# Patient Record
Sex: FEMALE | Race: WHITE | Employment: FULL TIME | URBAN - METROPOLITAN AREA
[De-identification: names, ages, dates, MRNs, and addresses within clinical notes are randomized per-mention and may not be internally consistent; named-entity substitution may affect disease eponyms.]

---

## 2020-10-26 LAB
ANTIBODY SCREEN, EXTERNAL: NEGATIVE
CHLAMYDIA, EXTERNAL: NEGATIVE
HBSAG, EXTERNAL: NON REACTIVE
HIV, EXTERNAL: NON REACTIVE
N. GONORRHEA, EXTERNAL: NEGATIVE
RUBELLA, EXTERNAL: NORMAL
T. PALLIDUM, EXTERNAL: NON REACTIVE
TYPE, ABO & RH, EXTERNAL: NORMAL

## 2020-12-19 ENCOUNTER — APPOINTMENT (OUTPATIENT)
Dept: ULTRASOUND IMAGING | Age: 35
End: 2020-12-19
Attending: EMERGENCY MEDICINE
Payer: COMMERCIAL

## 2020-12-19 ENCOUNTER — HOSPITAL ENCOUNTER (EMERGENCY)
Age: 35
Discharge: HOME | End: 2020-12-19
Attending: EMERGENCY MEDICINE
Payer: COMMERCIAL

## 2020-12-19 VITALS
RESPIRATION RATE: 16 BRPM | TEMPERATURE: 98.5 F | SYSTOLIC BLOOD PRESSURE: 125 MMHG | HEART RATE: 78 BPM | OXYGEN SATURATION: 100 % | DIASTOLIC BLOOD PRESSURE: 88 MMHG

## 2020-12-19 DIAGNOSIS — O26.892 ABDOMINAL PAIN DURING PREGNANCY IN SECOND TRIMESTER: Primary | ICD-10-CM

## 2020-12-19 DIAGNOSIS — R10.9 ABDOMINAL PAIN DURING PREGNANCY IN SECOND TRIMESTER: Primary | ICD-10-CM

## 2020-12-19 LAB
ALBUMIN SERPL-MCNC: 3.6 G/DL (ref 3.5–5)
ALBUMIN/GLOB SERPL: 0.9 {RATIO} (ref 1.1–2.2)
ALP SERPL-CCNC: 50 U/L (ref 45–117)
ALT SERPL-CCNC: 38 U/L (ref 12–78)
ANION GAP SERPL CALC-SCNC: 4 MMOL/L (ref 5–15)
APPEARANCE UR: CLEAR
AST SERPL-CCNC: 24 U/L (ref 15–37)
BASOPHILS # BLD: 0 K/UL (ref 0–0.1)
BASOPHILS NFR BLD: 0 % (ref 0–1)
BILIRUB SERPL-MCNC: 0.4 MG/DL (ref 0.2–1)
BILIRUB UR QL: NEGATIVE
BUN SERPL-MCNC: 6 MG/DL (ref 6–20)
BUN/CREAT SERPL: 10 (ref 12–20)
CALCIUM SERPL-MCNC: 9.4 MG/DL (ref 8.5–10.1)
CHLORIDE SERPL-SCNC: 108 MMOL/L (ref 97–108)
CO2 SERPL-SCNC: 24 MMOL/L (ref 21–32)
COLOR UR: NORMAL
COMMENT, HOLDF: NORMAL
CREAT SERPL-MCNC: 0.62 MG/DL (ref 0.55–1.02)
DIFFERENTIAL METHOD BLD: NORMAL
EOSINOPHIL # BLD: 0 K/UL (ref 0–0.4)
EOSINOPHIL NFR BLD: 1 % (ref 0–7)
ERYTHROCYTE [DISTWIDTH] IN BLOOD BY AUTOMATED COUNT: 12.5 % (ref 11.5–14.5)
GLOBULIN SER CALC-MCNC: 4 G/DL (ref 2–4)
GLUCOSE SERPL-MCNC: 84 MG/DL (ref 65–100)
GLUCOSE UR STRIP.AUTO-MCNC: NEGATIVE MG/DL
HCT VFR BLD AUTO: 36.3 % (ref 35–47)
HGB BLD-MCNC: 12.3 G/DL (ref 11.5–16)
HGB UR QL STRIP: NEGATIVE
IMM GRANULOCYTES # BLD AUTO: 0 K/UL (ref 0–0.04)
IMM GRANULOCYTES NFR BLD AUTO: 0 % (ref 0–0.5)
KETONES UR QL STRIP.AUTO: NEGATIVE MG/DL
LEUKOCYTE ESTERASE UR QL STRIP.AUTO: NEGATIVE
LYMPHOCYTES # BLD: 1.5 K/UL (ref 0.8–3.5)
LYMPHOCYTES NFR BLD: 22 % (ref 12–49)
MCH RBC QN AUTO: 31.5 PG (ref 26–34)
MCHC RBC AUTO-ENTMCNC: 33.9 G/DL (ref 30–36.5)
MCV RBC AUTO: 92.8 FL (ref 80–99)
MONOCYTES # BLD: 0.6 K/UL (ref 0–1)
MONOCYTES NFR BLD: 9 % (ref 5–13)
NEUTS SEG # BLD: 4.6 K/UL (ref 1.8–8)
NEUTS SEG NFR BLD: 68 % (ref 32–75)
NITRITE UR QL STRIP.AUTO: NEGATIVE
NRBC # BLD: 0 K/UL (ref 0–0.01)
NRBC BLD-RTO: 0 PER 100 WBC
PH UR STRIP: 7.5 [PH] (ref 5–8)
PLATELET # BLD AUTO: 213 K/UL (ref 150–400)
PMV BLD AUTO: 9.3 FL (ref 8.9–12.9)
POTASSIUM SERPL-SCNC: 4.1 MMOL/L (ref 3.5–5.1)
PROT SERPL-MCNC: 7.6 G/DL (ref 6.4–8.2)
PROT UR STRIP-MCNC: NEGATIVE MG/DL
RBC # BLD AUTO: 3.91 M/UL (ref 3.8–5.2)
SAMPLES BEING HELD,HOLD: NORMAL
SODIUM SERPL-SCNC: 136 MMOL/L (ref 136–145)
SP GR UR REFRACTOMETRY: 1.01 (ref 1–1.03)
UROBILINOGEN UR QL STRIP.AUTO: 0.2 EU/DL (ref 0.2–1)
WBC # BLD AUTO: 6.8 K/UL (ref 3.6–11)

## 2020-12-19 PROCEDURE — 80053 COMPREHEN METABOLIC PANEL: CPT

## 2020-12-19 PROCEDURE — 81003 URINALYSIS AUTO W/O SCOPE: CPT

## 2020-12-19 PROCEDURE — 36415 COLL VENOUS BLD VENIPUNCTURE: CPT

## 2020-12-19 PROCEDURE — 76815 OB US LIMITED FETUS(S): CPT

## 2020-12-19 PROCEDURE — 99285 EMERGENCY DEPT VISIT HI MDM: CPT

## 2020-12-19 PROCEDURE — 99284 EMERGENCY DEPT VISIT MOD MDM: CPT

## 2020-12-19 PROCEDURE — 85025 COMPLETE CBC W/AUTO DIFF WBC: CPT

## 2020-12-19 RX ORDER — CYCLOBENZAPRINE HCL 5 MG
5 TABLET ORAL
Qty: 12 TAB | Refills: 0 | Status: SHIPPED | OUTPATIENT
Start: 2020-12-19 | End: 2021-03-20

## 2020-12-19 NOTE — ED NOTES
I have reviewed discharge instructions with the patient. The patient verbalized understanding. Patient informed prescriptions were sent to pharmacy. All peripheral IVs removed. Patient given an opportunity to ask questions. All questions answered. RN walked patient to ED entrance.

## 2020-12-19 NOTE — DISCHARGE INSTRUCTIONS
Dr. Сергей Salazar has recommended the following options for your pain:  1. Tylenol  2. Flexeril (muscle relaxant) electronically sent to your pharmacy if you choose to pick it up. 3.  Pelvic physical therapy. 4.  YouTube prenatal yoga videos.   5.  YouTube prenatal stretch videos

## 2020-12-19 NOTE — ED TRIAGE NOTES
Pt arrives from home ambulatory from home with CC of sharp abdominal pain. Pt is currently 16 weeks pregnant. States this has happened before but not as severe and the OB believed it was nerve pain. Pt very anxious and tearful in triage.

## 2020-12-19 NOTE — ED PROVIDER NOTES
The history is provided by the patient. Abdominal Pain   This is a recurrent problem. The current episode started 6 to 12 hours ago. The problem occurs constantly. The problem has been gradually worsening. The pain is located in the RLQ and LLQ. The pain is severe. Associated symptoms include nausea. Pertinent negatives include no fever, no diarrhea, no hematochezia, no melena and no constipation. Nothing worsens the pain. The pain is relieved by nothing. Pregnancy Problem   This is a recurrent problem. The problem occurs constantly. The problem has been gradually worsening. Associated symptoms include nausea. Pertinent negatives include no fever, no diarrhea, no hematochezia, no melena and no constipation. Nothing worsens the pain. The pain is relieved by nothing. Past Medical History:   Diagnosis Date    Abnormal Pap smear     Psychiatric disorder     ANXIETY/DEPRESSION    Thyroid disease     hypothyroidism       Past Surgical History:   Procedure Laterality Date    HX HEENT      WISDOM TEETH    HX ORTHOPAEDIC  1998    A/S RIGHT HIP         Family History:   Problem Relation Age of Onset    Heart Disease Paternal Uncle         CABG, CAD    Cancer Maternal Grandmother         BREAST CA    Cancer Maternal Grandfather         PANCREATIC CA    Heart Disease Paternal Grandfather         CHF       Social History     Socioeconomic History    Marital status: SINGLE     Spouse name: Not on file    Number of children: Not on file    Years of education: Not on file    Highest education level: Not on file   Occupational History    Not on file   Social Needs    Financial resource strain: Not on file    Food insecurity     Worry: Not on file     Inability: Not on file    Transportation needs     Medical: Not on file     Non-medical: Not on file   Tobacco Use    Smoking status: Never Smoker   Substance and Sexual Activity    Alcohol use:  Yes     Alcohol/week: 1.7 standard drinks     Types: 2 Glasses of wine per week    Drug use: Not on file    Sexual activity: Not on file   Lifestyle    Physical activity     Days per week: Not on file     Minutes per session: Not on file    Stress: Not on file   Relationships    Social connections     Talks on phone: Not on file     Gets together: Not on file     Attends Islam service: Not on file     Active member of club or organization: Not on file     Attends meetings of clubs or organizations: Not on file     Relationship status: Not on file    Intimate partner violence     Fear of current or ex partner: Not on file     Emotionally abused: Not on file     Physically abused: Not on file     Forced sexual activity: Not on file   Other Topics Concern    Not on file   Social History Narrative    Not on file         ALLERGIES: Amoxicillin, Erythromycin, Penicillin g, and Sulfa (sulfonamide antibiotics)    Review of Systems   Constitutional: Negative for fever. Gastrointestinal: Positive for abdominal pain and nausea. Negative for constipation, diarrhea, hematochezia and melena. Psychiatric/Behavioral: The patient is nervous/anxious. All other systems reviewed and are negative. Vitals:    12/19/20 0705   BP: (!) 155/95   Pulse: (!) 107   Resp: 18   Temp: 98.3 °F (36.8 °C)   SpO2: 98%            Physical Exam  Vitals signs and nursing note reviewed. Constitutional:       Appearance: She is well-developed. HENT:      Head: Normocephalic and atraumatic. Eyes:      Extraocular Movements: Extraocular movements intact. Pupils: Pupils are equal, round, and reactive to light. Cardiovascular:      Rate and Rhythm: Normal rate and regular rhythm. Pulmonary:      Effort: Pulmonary effort is normal.      Breath sounds: Normal breath sounds. Abdominal:      General: Bowel sounds are normal.      Tenderness: There is abdominal tenderness in the right lower quadrant, suprapubic area and left lower quadrant. Hernia: No hernia is present.    Skin: General: Skin is warm and dry. Capillary Refill: Capillary refill takes less than 2 seconds. Neurological:      General: No focal deficit present. Mental Status: She is alert and oriented to person, place, and time. Psychiatric:         Mood and Affect: Mood normal.         Behavior: Behavior normal.        Physical exam is significant for mild tenderness to palpation in the suprapubic area. Patient reports that palpation in that area produces referred pressure sensation to her rectum. MDM  Number of Diagnoses or Management Options  Abdominal pain during pregnancy in second trimester: new and requires workup     Amount and/or Complexity of Data Reviewed  Clinical lab tests: reviewed  Tests in the radiology section of CPT®: reviewed  Discuss the patient with other providers: yes    Risk of Complications, Morbidity, and/or Mortality  Presenting problems: moderate  Diagnostic procedures: moderate  Management options: moderate    Patient Progress  Patient progress: stable    ED Course as of Dec 19 1034   Sat Dec 19, 2020   1010 Discussed the case with Dr. Ludivina Sandoval, [facility] hospitalist on call. Reviewed presentation, work-up, and results. She is in agreement with appropriate discharged home. She recommended Tylenol as needed for the pain she also said that in some instances she will provide Flexeril for patient. She also recommended YouTube videos of prenatal yoga, and stretches. She also recommended pelvic PT. patient will be informed of this discussion with the hospitalist specialist and instructed to follow-up with her primary care doctor.     [VT]      ED Course User Index  [VT] Norman Flores MD       Procedures    LABORATORY TESTS:   Labs Reviewed   METABOLIC PANEL, COMPREHENSIVE - Abnormal; Notable for the following components:       Result Value    Anion gap 4 (*)     BUN/Creatinine ratio 10 (*)     A-G Ratio 0.9 (*)     All other components within normal limits   CBC WITH AUTOMATED DIFF URINALYSIS W/ RFLX MICROSCOPIC   SAMPLES BEING HELD   SAMPLE TO BLOOD BANK       IMAGING RESULTS:   US PREG UTS LTD   Final Result   IMPRESSION: Viable intrauterine pregnancy estimated gestational age of 12 weeks   and 3 days. MEDICATIONS GIVEN:  Medications - No data to display    IMPRESSION:  1. Abdominal pain during pregnancy in second trimester        PLAN:  1.  Discharge to Home

## 2021-01-28 ENCOUNTER — TRANSCRIBE ORDER (OUTPATIENT)
Dept: SCHEDULING | Age: 36
End: 2021-01-28

## 2021-01-28 DIAGNOSIS — R10.10 UPPER ABDOMINAL PAIN: Primary | ICD-10-CM

## 2021-02-10 ENCOUNTER — HOSPITAL ENCOUNTER (OUTPATIENT)
Dept: ULTRASOUND IMAGING | Age: 36
Discharge: HOME | End: 2021-02-10
Attending: OBSTETRICS & GYNECOLOGY
Payer: COMMERCIAL

## 2021-02-10 DIAGNOSIS — R10.10 UPPER ABDOMINAL PAIN: ICD-10-CM

## 2021-02-10 PROCEDURE — 76705 ECHO EXAM OF ABDOMEN: CPT

## 2021-03-20 ENCOUNTER — HOSPITAL ENCOUNTER (EMERGENCY)
Age: 36
Discharge: HOME | End: 2021-03-20
Admitting: OBSTETRICS & GYNECOLOGY
Payer: COMMERCIAL

## 2021-03-20 VITALS
WEIGHT: 160 LBS | BODY MASS INDEX: 24.25 KG/M2 | HEIGHT: 68 IN | RESPIRATION RATE: 18 BRPM | OXYGEN SATURATION: 99 % | SYSTOLIC BLOOD PRESSURE: 133 MMHG | HEART RATE: 78 BPM | DIASTOLIC BLOOD PRESSURE: 67 MMHG

## 2021-03-20 LAB
ALBUMIN SERPL-MCNC: 3.2 G/DL (ref 3.5–5)
ALBUMIN/GLOB SERPL: 0.9 {RATIO} (ref 1.1–2.2)
ALP SERPL-CCNC: 84 U/L (ref 45–117)
ALT SERPL-CCNC: 29 U/L (ref 12–78)
ANION GAP SERPL CALC-SCNC: 6 MMOL/L (ref 5–15)
AST SERPL-CCNC: 16 U/L (ref 15–37)
BASOPHILS # BLD: 0 K/UL (ref 0–0.1)
BASOPHILS NFR BLD: 0 % (ref 0–1)
BILIRUB SERPL-MCNC: 0.2 MG/DL (ref 0.2–1)
BUN SERPL-MCNC: 6 MG/DL (ref 6–20)
BUN/CREAT SERPL: 12 (ref 12–20)
CALCIUM SERPL-MCNC: 9.2 MG/DL (ref 8.5–10.1)
CHLORIDE SERPL-SCNC: 108 MMOL/L (ref 97–108)
CO2 SERPL-SCNC: 26 MMOL/L (ref 21–32)
CREAT SERPL-MCNC: 0.52 MG/DL (ref 0.55–1.02)
CREAT UR-MCNC: 17.7 MG/DL
DIFFERENTIAL METHOD BLD: ABNORMAL
EOSINOPHIL # BLD: 0 K/UL (ref 0–0.4)
EOSINOPHIL NFR BLD: 0 % (ref 0–7)
ERYTHROCYTE [DISTWIDTH] IN BLOOD BY AUTOMATED COUNT: 12.6 % (ref 11.5–14.5)
GLOBULIN SER CALC-MCNC: 3.4 G/DL (ref 2–4)
GLUCOSE SERPL-MCNC: 83 MG/DL (ref 65–100)
HCT VFR BLD AUTO: 33.6 % (ref 35–47)
HGB BLD-MCNC: 11.8 G/DL (ref 11.5–16)
IMM GRANULOCYTES # BLD AUTO: 0.1 K/UL (ref 0–0.04)
IMM GRANULOCYTES NFR BLD AUTO: 1 % (ref 0–0.5)
LDH SERPL L TO P-CCNC: 138 U/L (ref 81–246)
LYMPHOCYTES # BLD: 1.7 K/UL (ref 0.8–3.5)
LYMPHOCYTES NFR BLD: 18 % (ref 12–49)
MCH RBC QN AUTO: 32.9 PG (ref 26–34)
MCHC RBC AUTO-ENTMCNC: 35.1 G/DL (ref 30–36.5)
MCV RBC AUTO: 93.6 FL (ref 80–99)
MONOCYTES # BLD: 0.9 K/UL (ref 0–1)
MONOCYTES NFR BLD: 10 % (ref 5–13)
NEUTS SEG # BLD: 6.9 K/UL (ref 1.8–8)
NEUTS SEG NFR BLD: 71 % (ref 32–75)
NRBC # BLD: 0 K/UL (ref 0–0.01)
NRBC BLD-RTO: 0 PER 100 WBC
PLATELET # BLD AUTO: 167 K/UL (ref 150–400)
PMV BLD AUTO: 9.1 FL (ref 8.9–12.9)
POTASSIUM SERPL-SCNC: 4 MMOL/L (ref 3.5–5.1)
PROT SERPL-MCNC: 6.6 G/DL (ref 6.4–8.2)
PROT UR-MCNC: <5 MG/DL (ref 0–11.9)
PROT/CREAT UR-RTO: NORMAL
RBC # BLD AUTO: 3.59 M/UL (ref 3.8–5.2)
SODIUM SERPL-SCNC: 140 MMOL/L (ref 136–145)
URATE SERPL-MCNC: 4.3 MG/DL (ref 2.6–6)
WBC # BLD AUTO: 9.7 K/UL (ref 3.6–11)

## 2021-03-20 PROCEDURE — 84156 ASSAY OF PROTEIN URINE: CPT

## 2021-03-20 PROCEDURE — 36415 COLL VENOUS BLD VENIPUNCTURE: CPT

## 2021-03-20 PROCEDURE — 80053 COMPREHEN METABOLIC PANEL: CPT

## 2021-03-20 PROCEDURE — 85025 COMPLETE CBC W/AUTO DIFF WBC: CPT

## 2021-03-20 PROCEDURE — 84550 ASSAY OF BLOOD/URIC ACID: CPT

## 2021-03-20 PROCEDURE — 59025 FETAL NON-STRESS TEST: CPT

## 2021-03-20 PROCEDURE — 99285 EMERGENCY DEPT VISIT HI MDM: CPT

## 2021-03-20 PROCEDURE — 83615 LACTATE (LD) (LDH) ENZYME: CPT

## 2021-03-20 NOTE — PROGRESS NOTES
1500-pt here from home with concerns for no fetal movement  1512-L Marques here to see pt  1630-Marques back to see pt with lab results rev'd, pt back on monitor due to feeling low movement, baby visibly moving in belly, will assess  1655-d/c home, undelivered, pt felt baby move, verbalized understanding of d/c instructions

## 2021-03-20 NOTE — ED PROVIDER NOTES
Joe Cervantes is a 36 yo  at 29w3d with an RICA of 21. She presents to the [facility] for decreased fetal movement. Reports she woke up this morning and did not feel any fetal movement for three hours. She then had an hour of normal movement and then 4 hours of none. Reports she normally feels lots of fetal movement throughout the day. Yesterday she felt lots of normal movement. Also reports she has had headaches throughout pregnancy, but yesterday it was more intense. Also reports blurry vision in left eye, this is new since becoming pregnant, but a recent change. Pt has also had right upper abd pain, has had liver work up and it has been determined to be rib pain. Prenatal care has been received at [facility] with Dr. Angeline Murray. Pregnancy complicated by hypothyroidism, being followed by endocrine, currently on 50 mcg synthroid daily. Pt has a history of anxiety weaned off meds prior to pregnancy, but has recently discussed starting zoloft. Pt has also had some elevated BPs in office, with normal home readings, negative Pre E work up in office. The history is provided by the patient. Decreased Fetal Movement   This is a new problem. The current episode started 6 to 12 hours ago. The problem occurs constantly. The problem has not changed since onset. The pain is at a severity of 0/10. The patient is experiencing no pain.         Past Medical History:   Diagnosis Date    Abnormal Pap smear     Abnormal Papanicolaou smear of cervix     LEEP    Complication of anesthesia     nausea and vomiting    Psychiatric disorder     ANXIETY/DEPRESSION    Thyroid disease     hypothyroidism       Past Surgical History:   Procedure Laterality Date    HX HEENT      WISDOM TEETH    HX ORTHOPAEDIC      A/S RIGHT HIP         Family History:   Problem Relation Age of Onset    Cancer Maternal Grandmother         BREAST CA    Cancer Maternal Grandfather         PANCREATIC CA    Heart Disease Paternal Grandfather         CHF   Jewell Curl Heart Disease Paternal Uncle         CABG, CAD       Social History     Socioeconomic History    Marital status:      Spouse name: Not on file    Number of children: Not on file    Years of education: Not on file    Highest education level: Not on file   Occupational History    Not on file   Social Needs    Financial resource strain: Not on file    Food insecurity     Worry: Not on file     Inability: Not on file    Transportation needs     Medical: Not on file     Non-medical: Not on file   Tobacco Use    Smoking status: Never Smoker    Smokeless tobacco: Never Used   Substance and Sexual Activity    Alcohol use: Not Currently     Alcohol/week: 1.7 standard drinks     Types: 2 Glasses of wine per week    Drug use: Not Currently    Sexual activity: Not on file   Lifestyle    Physical activity     Days per week: Not on file     Minutes per session: Not on file    Stress: Not on file   Relationships    Social connections     Talks on phone: Not on file     Gets together: Not on file     Attends Gnosticism service: Not on file     Active member of club or organization: Not on file     Attends meetings of clubs or organizations: Not on file     Relationship status: Not on file    Intimate partner violence     Fear of current or ex partner: Not on file     Emotionally abused: Not on file     Physically abused: Not on file     Forced sexual activity: Not on file   Other Topics Concern     Service Not Asked    Blood Transfusions Not Asked    Caffeine Concern Not Asked    Occupational Exposure Not Asked   Alicia Olguin Hazards Not Asked    Sleep Concern Not Asked    Stress Concern Not Asked    Weight Concern Not Asked    Special Diet Not Asked    Back Care Not Asked    Exercise Not Asked    Bike Helmet Not Asked    Seat Belt Not Asked    Self-Exams Not Asked   Social History Narrative    Not on file         ALLERGIES: Amoxicillin, Erythromycin, Penicillin g, and Sulfa (sulfonamide antibiotics)    Review of Systems   Constitutional: Negative. HENT: Negative. Eyes: Positive for visual disturbance. Respiratory: Negative. Cardiovascular: Negative. Gastrointestinal: Negative. Endocrine: Negative. Genitourinary: Negative. Musculoskeletal: Negative. Skin: Negative. Allergic/Immunologic: Negative. Neurological: Negative. Hematological: Negative. Psychiatric/Behavioral: Negative. Vitals:    03/20/21 1507 03/20/21 1519 03/20/21 1528 03/20/21 1545   BP: (!) 145/95 139/79 139/78 133/67   Pulse: 76 82 98 78   Resp:   18    SpO2:  99%     Weight:   72.6 kg (160 lb)    Height:   5' 8\" (1.727 m)             Physical Exam  Vitals signs and nursing note reviewed. Exam conducted with a chaperone present. Constitutional:       Appearance: Normal appearance. She is normal weight. HENT:      Head: Normocephalic and atraumatic. Nose: Nose normal.      Mouth/Throat:      Mouth: Mucous membranes are moist.      Pharynx: Oropharynx is clear. Eyes:      Extraocular Movements: Extraocular movements intact. Neck:      Musculoskeletal: Normal range of motion and neck supple. Cardiovascular:      Rate and Rhythm: Normal rate and regular rhythm. Pulses: Normal pulses. Heart sounds: Normal heart sounds. Pulmonary:      Effort: Pulmonary effort is normal.      Breath sounds: Normal breath sounds. Abdominal:      Palpations: Abdomen is soft. Comments: Gravid, soft to palpation   Genitourinary:     Comments: deferred  Musculoskeletal: Normal range of motion. Skin:     General: Skin is warm and dry. Capillary Refill: Capillary refill takes less than 2 seconds. Neurological:      General: No focal deficit present. Mental Status: She is alert and oriented to person, place, and time. Mental status is at baseline. Psychiatric:         Mood and Affect: Mood normal.         Behavior: Behavior normal.         Thought Content:  Thought content normal.         Judgment: Judgment normal.      NST: Monitored for 20 minutes, reactive, cat 1, baseline 145, positive accels, no decels, moderate variability, rare ctx, mild to palpation, not felt by pt, resting tone soft    2nd NST prior to discharge:    Monitored for 20 minutes, reactive, cat 1, baseline 145, positive accels, no decels, moderate variability, no ctx, resting tone soft    Patient Vitals for the past 4 hrs:   Pulse Resp BP SpO2   03/20/21 1545 78  133/67    03/20/21 1528 98 18 139/78    03/20/21 1519 82  139/79 99 %   03/20/21 1507 76  (!) 145/95          MDM  Number of Diagnoses or Management Options     Amount and/or Complexity of Data Reviewed  Clinical lab tests: ordered and reviewed  Decide to obtain previous medical records or to obtain history from someone other than the patient: yes  Review and summarize past medical records: yes  Independent visualization of images, tracings, or specimens: yes    Risk of Complications, Morbidity, and/or Mortality  Presenting problems: moderate  Diagnostic procedures: moderate  Management options: moderate    Critical Care  Total time providing critical care: > 105 minutes    Patient Progress  Patient progress: stable    ED Course as of Mar 20 1624   Sat Mar 20, 2021   1519 Admit to [facility]  NST  Pre E labs and serial BPs    [LA]   1622 PROTEIN/CREATININE RATIO, URINE:    Protein, urine random <5   Creatinine, urine 17.70   Protein/Creat. urine Ratio Cannot be calculated [LA]   1622 LD:     [LA]   8564 METABOLIC PANEL, COMPREHENSIVE(!):    Sodium 140   Potassium 4.0   Chloride 108   CO2 26   Anion gap 6   Glucose 83   BUN 6   Creatinine 0.52(!)   BUN/Creatinine ratio 12   GFR est AA >60   GFR est non-AA >60   Calcium 9.2   Bilirubin, total 0.2   ALT 29   AST 16   Alk. phosphatase 84   Protein, total 6.6   Albumin 3.2(!)   Globulin 3.4   A-G Ratio 0.9(!) [LA]   1622 URIC ACID:    Uric acid 4.3 [LA]   1622 Reviewed labs and BPs with patient.  Pt feeling less anxious now. Reviewed in detail s/s of pre e and when to return with concerns. Also reviewed in detail fetal kick counts and movement and a low threshold to return with any concerns. Pt verbalized understanding. Discharged home. Keep next routine OB appt. CBC WITH AUTOMATED DIFF(!):    WBC 9.7   RBC 3.59(!)   HGB 11.8   HCT 33.6(!)   MCV 93.6   MCH 32.9   MCHC 35.1   RDW 12.6   PLATELET 097   MPV 9.1   NRBC 0.0   ABSOLUTE NRBC 0.00   NEUTROPHILS 71   LYMPHOCYTES 18   MONOCYTES 10   EOSINOPHILS 0   BASOPHILS 0   IMMATURE GRANULOCYTES 1(!)   ABS. NEUTROPHILS 6.9   ABS. LYMPHOCYTES 1.7   ABS. MONOCYTES 0.9   ABS. EOSINOPHILS 0.0   ABS. BASOPHILS 0.0   ABS. IMM.  GRANS. 0.1(!)   DF AUTOMATED [LA]      ED Course User Index  [LA] Giovanna Chand CNM

## 2021-03-20 NOTE — DISCHARGE INSTRUCTIONS
Patient Education        Weeks 26 to 30 of Your Pregnancy: Care Instructions  Your Care Instructions     You are now in your last trimester of pregnancy. Your baby is growing rapidly. And you'll probably feel your baby moving around more often. Your doctor may ask you to count your baby's kicks. Your back may ache as your body gets used to your baby's size and length. If you haven't already had the Tdap shot during this pregnancy, talk to your doctor about getting it. It will help protect your  against pertussis infection. During this time, it's important to take care of yourself and pay attention to what your body needs. If you feel sexual, explore ways to be close with your partner that match your comfort and desire. Use the tips provided in this care sheet to find ways to be sexual in your own way. Follow-up care is a key part of your treatment and safety. Be sure to make and go to all appointments, and call your doctor if you are having problems. It's also a good idea to know your test results and keep a list of the medicines you take. How can you care for yourself at home? Take it easy at work  · Take frequent breaks. If possible, stop working when you are tired, and rest during your lunch hour. · Take bathroom breaks every 2 hours. · Change positions often. If you sit for long periods, stand up and walk around. · When you stand for a long time, keep one foot on a low stool with your knee bent. After standing a lot, sit with your feet up. · Avoid fumes, chemicals, and tobacco smoke. Be sexual in your own way  · Having sex during pregnancy is okay, unless your doctor tells you not to. · You may be very interested in sex, or you may have no interest at all. · Your growing belly can make it hard to find a good position during intercourse. [place] and explore. · You may get cramps in your uterus when your partner touches your breasts.   · A back rub may relieve the backache or cramps that sometimes follow orgasm. Learn about  labor  · Watch for signs of  labor. You may be going into labor if:  ? You have menstrual-like cramps, with or without nausea. ? You have about 6 or more contractions in 1 hour, even after you have had a glass of water and are resting. ? You have a low, dull backache that does not go away when you change your position. ? You have pain or pressure in your pelvis that comes and goes in a pattern. ? You have intestinal cramping or flu-like symptoms, with or without diarrhea.  ? You notice an increase or change in your vaginal discharge. Discharge may be heavy, mucus-like, watery, or streaked with blood. ? Your water breaks. · If you think you have  labor:  ? Drink 2 or 3 glasses of water or juice. Not drinking enough fluids can cause contractions. ? Stop what you are doing, and empty your bladder. Then lie down on your left side for at least 1 hour. ? While lying on your side, find your breast bone. Put your fingers in the soft spot just below it. Move your fingers down toward your belly button to find the top of your uterus. Check to see if it is tight. ? Contractions can be weak or strong. Record your contractions for an hour. Time a contraction from the start of one contraction to the start of the next one.  ? Single or several strong contractions without a pattern are called [place] contractions. They are practice contractions but not the start of labor. They often stop if you change what you are doing. ? Call your doctor if you have regular contractions. Where can you learn more? Go to http://www.gray.com/  Enter U674 in the search box to learn more about \"Weeks 26 to 30 of Your Pregnancy: Care Instructions. \"  Current as of: 2020               Content Version: 12.6  © 4982-9465 [employer].    Care instructions adapted under license by [employer] (which disclaims liability or warranty for this information). If you have questions about a medical condition or this instruction, always ask your healthcare professional. [place] [address] any warranty or liability for your use of this information.

## 2021-03-29 ENCOUNTER — OFFICE VISIT (OUTPATIENT)
Dept: CARDIOLOGY CLINIC | Age: 36
End: 2021-03-29
Payer: COMMERCIAL

## 2021-03-29 VITALS
BODY MASS INDEX: 24.71 KG/M2 | OXYGEN SATURATION: 98 % | HEIGHT: 68 IN | DIASTOLIC BLOOD PRESSURE: 78 MMHG | SYSTOLIC BLOOD PRESSURE: 128 MMHG | HEART RATE: 92 BPM | WEIGHT: 163 LBS

## 2021-03-29 DIAGNOSIS — H53.8 BLURRED VISION, LEFT EYE: ICD-10-CM

## 2021-03-29 DIAGNOSIS — R42 DIZZINESS: ICD-10-CM

## 2021-03-29 DIAGNOSIS — R00.2 PALPITATIONS: Primary | ICD-10-CM

## 2021-03-29 DIAGNOSIS — R01.1 MURMUR: ICD-10-CM

## 2021-03-29 DIAGNOSIS — Z3A.31 PREGNANCY WITH 31 COMPLETED WEEKS GESTATION: ICD-10-CM

## 2021-03-29 PROCEDURE — 93000 ELECTROCARDIOGRAM COMPLETE: CPT | Performed by: SPECIALIST

## 2021-03-29 PROCEDURE — 99244 OFF/OP CNSLTJ NEW/EST MOD 40: CPT | Performed by: SPECIALIST

## 2021-03-29 NOTE — PROGRESS NOTES
Nichelle Doe Ceresco     1985       Roly Quintana MD, [facility]  Date of Visit-3/29/2021   PCP is Susannah Hawley MD   [facility] Delonte Ramsay  [facility]  [address] [facility] [place]  [facility]  HPI:  Lowell Farrar is a 37 y.o. female   Was in the ER on 3/20/21 noting decreased fetal movement. Her RICA is 6/2/21. She has also had blurry vision in the left eye. She has a family hx of heart disease but not first degree. Her BP was initially elevated at 145. She was referred by Dr. Ted Sanderson because of palpitations. She is seeing Dr. Lanie Davalos for hypothyroidism. She is not anemic, kidney function is normal.   EKG is normal.   She had an ultrasound of her abdomen on 2/10 that was normal.   Reviewed YA from [facility] with her gynecologist.     Pt states she had palpitations prior to her pregnancy but on last Monday they started to become more frequent. She would have many palpitations within the span of a few minutes with multiple episodes of this a day. This Sunday she notes she only had five palpitations, each lasting about a second and today she has only had two. She states the palpitations are worse at night and worse when she lays on her left side. She also notes that getting up from a seated position makes them come on. She denies any sensation of tachycardia. Denies chest pain, edema, syncope or shortness of breath at rest    EKG: Sinus  Rhythm --RSR(V1) -nondiagnostic. Poor SNR (< 1) in leads  V4   PROBABLY NORMAL    Assessment/Plan:     She has palpitations which sound fairly benign. Likely are PVC's. She has a systolic murmur that is likely a flow murmur. She is not symptomatic with SOB or tachycardia. I would like to get a holter and echo. The holter is to see if she has an SVT that would impact her delivery and the echo is for the loud systolic murmur.    Patient Instructions   You will be scheduled for an echocardiogram and a 48 hour Holter monitor. No future appointments. Impression:   1. Palpitations    2. Murmur    3. Dizziness    4. Blurred vision, left eye    5. Pregnancy with 31 completed weeks gestation       Cardiac History:   No specialty comments available. History= hypothyroidism  Allergy =penicillin amoxicillin erythromycin sulfa  all cause rash and vomiting,all  when she was young  Other medical history includes =current pregnancy of 31 weeks; right hip scope with torn labrum 2013 ;LEEP procedure 2012 ;scope of hip 1998 ;no prior cath blood transfusion or ulcer    Social history =never smoked rare alcohol none none since pregnant also caffeine rarely during pregnancy she is pregnant the first child she is a   enjoys exercise cooking and travel    Family history =mother 65-father is 61, has two siblings all in excellent health    Review of systems   positive irregular heartbeat dizziness needing glasses blurry vision urinary frequency belching nausea thyroid disorder LMP is August 2020 aching joints paining legs difficulty sleeping depression anxiety  ROS-except as noted above. . A complete cardiac and respiratory are reviewed and negative except as above ; Resp-denies wheezing  or productive cough,. Const- No unusual weight loss or fever; Neuro-no recent seizure or CVA ; GI- No BRBPR, abdom pain, bloating ; - no  hematuria   see supplement sheet, initialed and to be scanned by staff  Past Medical History:   Diagnosis Date    Abnormal Pap smear     Abnormal Papanicolaou smear of cervix 2012    LEEP    Complication of anesthesia     nausea and vomiting    Psychiatric disorder     ANXIETY/DEPRESSION    Thyroid disease     hypothyroidism      Social Hx= reports that she has never smoked. She has never used smokeless tobacco. She reports previous alcohol use of about 1.7 standard drinks of alcohol per week. She reports previous drug use.      Exam and Labs:  /78 (BP 1 Location: Left upper arm, BP Patient Position: Sitting, BP Cuff Size: Adult)   Pulse 92   Ht 5' 8\" (1.727 m)   Wt 163 lb (73.9 kg)   SpO2 98%   BMI 24.78 kg/m² Constitutional:  NAD, comfortable  Head: NC,AT. Eyes: No scleral icterus. Neck:  Neck supple. No JVD present. Throat: moist mucous membranes. Chest: Effort normal & normal respiratory excursion . Neurological: alert, conversant and oriented . Skin: Skin is not cold. No obvious systemic rash noted. Not diaphoretic. No erythema. Psychiatric:  Grossly normal mood and affect. Behavior appears normal. Extremities:  no clubbing or cyanosis. Abdomen: non distended    Lungs:breath sounds normal. No stridor. distress, wheezes or  Rales. Heart:2/6 RUSB LUSB normal rate, regular rhythm, normal S1, S2, no rubs, clicks or gallops , PMI non displaced. Edema: Edema is none. No results found for: CHOL, CHOLX, CHLST, CHOLV, HDL, HDLP, LDL, LDLC, DLDLP, TGLX, TRIGL, TRIGP, CHHD, CHHDX  Lab Results   Component Value Date/Time    Sodium 140 03/20/2021 03:35 PM    Potassium 4.0 03/20/2021 03:35 PM    Chloride 108 03/20/2021 03:35 PM    CO2 26 03/20/2021 03:35 PM    Anion gap 6 03/20/2021 03:35 PM    Glucose 83 03/20/2021 03:35 PM    BUN 6 03/20/2021 03:35 PM    Creatinine 0.52 (L) 03/20/2021 03:35 PM    BUN/Creatinine ratio 12 03/20/2021 03:35 PM    GFR est AA >60 03/20/2021 03:35 PM    GFR est non-AA >60 03/20/2021 03:35 PM    Calcium 9.2 03/20/2021 03:35 PM      Wt Readings from Last 3 Encounters:   03/29/21 163 lb (73.9 kg)   03/20/21 160 lb (72.6 kg)   11/30/12 147 lb (66.7 kg)      BP Readings from Last 3 Encounters:   03/29/21 128/78   03/20/21 133/67   12/19/20 125/88      Current Outpatient Medications   Medication Sig    PNV YW.11/LBVBOOT fum/folic ac (PRENATAL PO) Take  by mouth.  cholecalciferol, vitD3,/vit K2 (VITAMIN D3-VITAMIN K2 PO) Take  by mouth.  zinc 50 mg tab tablet Take  by mouth daily.  magnesium gluconate 500 mg (27 mg  elemental) tablet Take 250 mg by mouth daily.  doxylamine succinate (UNISOM) 25 mg tablet Take 10 mg by mouth nightly as needed.  levothyroxine (SYNTHROID) 75 mcg tablet Take 50 mcg by mouth nightly.  LACTOBACILLUS COMBO NO.6 (PROBIOTIC COMPLEX PO) Take 1 Tab by mouth daily. No current facility-administered medications for this visit. Impression see above.       Written by Cinthya Chen, as dictated by Meseret Mccollum MD.

## 2021-03-29 NOTE — Clinical Note
3/29/2021 Patient: Anabela Gastelum YOB: 1985 Date of Visit: 3/29/2021 Rose Genao MD 
[address] [facility] [address] [place] [address] [phone] Via Fax: [phone] Dear Rose Genao MD, Thank you for referring Ms. Nichelle Summers to [facility] for evaluation. My notes for this consultation are attached. If you have questions, please do not hesitate to call me. I look forward to following your patient along with you.  
 
 
Sincerely, 
 
Rosmery Clements MD

## 2021-03-30 ENCOUNTER — CLINICAL SUPPORT (OUTPATIENT)
Dept: CARDIOLOGY CLINIC | Age: 36
End: 2021-03-30
Payer: COMMERCIAL

## 2021-03-30 ENCOUNTER — ANCILLARY PROCEDURE (OUTPATIENT)
Dept: CARDIOLOGY CLINIC | Age: 36
End: 2021-03-30
Payer: COMMERCIAL

## 2021-03-30 VITALS
BODY MASS INDEX: 24.71 KG/M2 | SYSTOLIC BLOOD PRESSURE: 128 MMHG | HEIGHT: 68 IN | WEIGHT: 163 LBS | DIASTOLIC BLOOD PRESSURE: 78 MMHG

## 2021-03-30 DIAGNOSIS — R00.2 PALPITATIONS: ICD-10-CM

## 2021-03-30 DIAGNOSIS — R01.1 MURMUR: ICD-10-CM

## 2021-03-30 PROCEDURE — 93306 TTE W/DOPPLER COMPLETE: CPT | Performed by: SPECIALIST

## 2021-04-06 ENCOUNTER — TELEPHONE (OUTPATIENT)
Dept: CARDIOLOGY CLINIC | Age: 36
End: 2021-04-06

## 2021-04-06 NOTE — TELEPHONE ENCOUNTER
Verified patient with two types of identifiers. Let patient know I will call her with results of her echo and Holter as soon as I have the final reports. Patient verbalized understanding and will call with any other questions.

## 2021-04-06 NOTE — TELEPHONE ENCOUNTER
Patient is calling to follow up on results from her recent echo. Please advise.     Phone: [phone]

## 2021-04-08 LAB
ECHO AO ROOT DIAM: 3.08 CM
ECHO AV AREA PEAK VELOCITY: 2.67 CM2
ECHO AV AREA VTI: 2.82 CM2
ECHO AV AREA/BSA PEAK VELOCITY: 1.4 CM2/M2
ECHO AV AREA/BSA VTI: 1.5 CM2/M2
ECHO AV MEAN GRADIENT: 5.56 MMHG
ECHO AV PEAK GRADIENT: 9.42 MMHG
ECHO AV PEAK VELOCITY: 153.46 CM/S
ECHO AV VTI: 29.59 CM
ECHO IVC PROX: 1.21 CM
ECHO LA AREA 4C: 16.29 CM2
ECHO LA MAJOR AXIS: 3.67 CM
ECHO LA MINOR AXIS: 1.96 CM
ECHO LA VOL 2C: 50.71 ML (ref 22–52)
ECHO LA VOL 4C: 38.02 ML (ref 22–52)
ECHO LA VOL BP: 51.22 ML (ref 22–52)
ECHO LA VOL/BSA BIPLANE: 27.34 ML/M2 (ref 16–28)
ECHO LA VOLUME INDEX A2C: 27.06 ML/M2 (ref 16–28)
ECHO LA VOLUME INDEX A4C: 20.29 ML/M2 (ref 16–28)
ECHO LV E' LATERAL VELOCITY: 18.51 CM/S
ECHO LV E' SEPTAL VELOCITY: 10.8 CM/S
ECHO LV EDV A2C: 147.02 ML
ECHO LV EDV A4C: 154.29 ML
ECHO LV EDV BP: 150.71 ML (ref 56–104)
ECHO LV EDV INDEX A4C: 82.3 ML/M2
ECHO LV EDV INDEX BP: 80.4 ML/M2
ECHO LV EDV NDEX A2C: 78.5 ML/M2
ECHO LV EJECTION FRACTION A2C: 59 PERCENT
ECHO LV EJECTION FRACTION A4C: 57 PERCENT
ECHO LV EJECTION FRACTION BIPLANE: 57.3 PERCENT (ref 55–100)
ECHO LV ESV A2C: 59.62 ML
ECHO LV ESV A4C: 66.51 ML
ECHO LV ESV BP: 64.37 ML (ref 19–49)
ECHO LV ESV INDEX A2C: 31.8 ML/M2
ECHO LV ESV INDEX A4C: 35.5 ML/M2
ECHO LV ESV INDEX BP: 34.4 ML/M2
ECHO LV INTERNAL DIMENSION DIASTOLIC: 4.86 CM (ref 3.9–5.3)
ECHO LV INTERNAL DIMENSION SYSTOLIC: 3.35 CM
ECHO LV IVSD: 0.94 CM (ref 0.6–0.9)
ECHO LV MASS 2D: 171.3 G (ref 67–162)
ECHO LV MASS INDEX 2D: 91.4 G/M2 (ref 43–95)
ECHO LV POSTERIOR WALL DIASTOLIC: 1.04 CM (ref 0.6–0.9)
ECHO LVOT DIAM: 2.28 CM
ECHO LVOT PEAK GRADIENT: 4.03 MMHG
ECHO LVOT PEAK VELOCITY: 100.31 CM/S
ECHO LVOT SV: 83.5 ML
ECHO LVOT VTI: 20.45 CM
ECHO MV A VELOCITY: 51.4 CM/S
ECHO MV AREA PHT: 4.8 CM2
ECHO MV E DECELERATION TIME (DT): 158.11 MS
ECHO MV E VELOCITY: 70.92 CM/S
ECHO MV E/A RATIO: 1.38
ECHO MV E/E' LATERAL: 3.83
ECHO MV E/E' RATIO (AVERAGED): 5.2
ECHO MV E/E' SEPTAL: 6.57
ECHO MV PRESSURE HALF TIME (PHT): 45.85 MS
ECHO RA MAJOR AXIS: 3.71 CM
ECHO RV INTERNAL DIMENSION: 3.37 CM
ECHO RV TAPSE: 2.1 CM (ref 1.5–2)
LA VOL DISK BP: 45.04 ML (ref 22–52)
LVOT MG: 2.5 MMHG

## 2021-04-13 PROCEDURE — 93227 XTRNL ECG REC<48 HR R&I: CPT | Performed by: SPECIALIST

## 2021-04-13 PROCEDURE — 93225 XTRNL ECG REC<48 HRS REC: CPT | Performed by: SPECIALIST

## 2021-04-13 NOTE — TELEPHONE ENCOUNTER
Monitor looked fine  Very few PACs  No correlation to palps  Her echo was good too  Fu PRN  Let her know holter, I had already called her with the echo  No future appointments.

## 2021-04-14 NOTE — TELEPHONE ENCOUNTER
Two patient identifiers verified. Per MD patient called and given results. Patient will follow up PRN. Patient verbalized understanding and denies any further questions or concerns at this time.

## 2021-05-04 LAB — GRBS, EXTERNAL: NEGATIVE

## 2021-05-12 ENCOUNTER — HOSPITAL ENCOUNTER (OUTPATIENT)
Age: 36
Setting detail: OBSERVATION
Discharge: HOME | End: 2021-05-12
Attending: OBSTETRICS & GYNECOLOGY | Admitting: OBSTETRICS & GYNECOLOGY
Payer: COMMERCIAL

## 2021-05-12 VITALS
HEIGHT: 68 IN | HEART RATE: 78 BPM | BODY MASS INDEX: 25.46 KG/M2 | SYSTOLIC BLOOD PRESSURE: 143 MMHG | RESPIRATION RATE: 16 BRPM | DIASTOLIC BLOOD PRESSURE: 87 MMHG | WEIGHT: 168 LBS

## 2021-05-12 PROBLEM — O16.3 ELEVATED BLOOD PRESSURE AFFECTING PREGNANCY IN THIRD TRIMESTER, ANTEPARTUM: Status: ACTIVE | Noted: 2021-05-12

## 2021-05-12 LAB
ALBUMIN SERPL-MCNC: 3.2 G/DL (ref 3.5–5)
ALBUMIN/GLOB SERPL: 0.9 {RATIO} (ref 1.1–2.2)
ALP SERPL-CCNC: 176 U/L (ref 45–117)
ALT SERPL-CCNC: 31 U/L (ref 12–78)
ANION GAP SERPL CALC-SCNC: 8 MMOL/L (ref 5–15)
AST SERPL-CCNC: 25 U/L (ref 15–37)
BASOPHILS # BLD: 0 K/UL (ref 0–0.1)
BASOPHILS NFR BLD: 0 % (ref 0–1)
BILIRUB SERPL-MCNC: 0.4 MG/DL (ref 0.2–1)
BUN SERPL-MCNC: 7 MG/DL (ref 6–20)
BUN/CREAT SERPL: 13 (ref 12–20)
CALCIUM SERPL-MCNC: 9.2 MG/DL (ref 8.5–10.1)
CHLORIDE SERPL-SCNC: 109 MMOL/L (ref 97–108)
CO2 SERPL-SCNC: 21 MMOL/L (ref 21–32)
CREAT SERPL-MCNC: 0.55 MG/DL (ref 0.55–1.02)
CREAT UR-MCNC: <13 MG/DL
DIFFERENTIAL METHOD BLD: ABNORMAL
EOSINOPHIL # BLD: 0 K/UL (ref 0–0.4)
EOSINOPHIL NFR BLD: 0 % (ref 0–7)
ERYTHROCYTE [DISTWIDTH] IN BLOOD BY AUTOMATED COUNT: 12.6 % (ref 11.5–14.5)
GLOBULIN SER CALC-MCNC: 3.4 G/DL (ref 2–4)
GLUCOSE SERPL-MCNC: 80 MG/DL (ref 65–100)
HCT VFR BLD AUTO: 37.6 % (ref 35–47)
HGB BLD-MCNC: 12.9 G/DL (ref 11.5–16)
IMM GRANULOCYTES # BLD AUTO: 0.1 K/UL (ref 0–0.04)
IMM GRANULOCYTES NFR BLD AUTO: 1 % (ref 0–0.5)
LDH SERPL L TO P-CCNC: 175 U/L (ref 81–246)
LYMPHOCYTES # BLD: 1.5 K/UL (ref 0.8–3.5)
LYMPHOCYTES NFR BLD: 18 % (ref 12–49)
MCH RBC QN AUTO: 32.7 PG (ref 26–34)
MCHC RBC AUTO-ENTMCNC: 34.3 G/DL (ref 30–36.5)
MCV RBC AUTO: 95.4 FL (ref 80–99)
MONOCYTES # BLD: 0.8 K/UL (ref 0–1)
MONOCYTES NFR BLD: 9 % (ref 5–13)
NEUTS SEG # BLD: 6.3 K/UL (ref 1.8–8)
NEUTS SEG NFR BLD: 72 % (ref 32–75)
NRBC # BLD: 0 K/UL (ref 0–0.01)
NRBC BLD-RTO: 0 PER 100 WBC
PLATELET # BLD AUTO: 146 K/UL (ref 150–400)
PMV BLD AUTO: 10 FL (ref 8.9–12.9)
POTASSIUM SERPL-SCNC: 4.3 MMOL/L (ref 3.5–5.1)
PROT SERPL-MCNC: 6.6 G/DL (ref 6.4–8.2)
PROT UR-MCNC: <5 MG/DL (ref 0–11.9)
PROT/CREAT UR-RTO: NORMAL
RBC # BLD AUTO: 3.94 M/UL (ref 3.8–5.2)
SODIUM SERPL-SCNC: 138 MMOL/L (ref 136–145)
URATE SERPL-MCNC: 6.4 MG/DL (ref 2.6–6)
WBC # BLD AUTO: 8.7 K/UL (ref 3.6–11)

## 2021-05-12 PROCEDURE — 36415 COLL VENOUS BLD VENIPUNCTURE: CPT

## 2021-05-12 PROCEDURE — 84550 ASSAY OF BLOOD/URIC ACID: CPT

## 2021-05-12 PROCEDURE — 99285 EMERGENCY DEPT VISIT HI MDM: CPT

## 2021-05-12 PROCEDURE — 80053 COMPREHEN METABOLIC PANEL: CPT

## 2021-05-12 PROCEDURE — 82570 ASSAY OF URINE CREATININE: CPT

## 2021-05-12 PROCEDURE — 99218 HC RM OBSERVATION: CPT

## 2021-05-12 PROCEDURE — 85025 COMPLETE CBC W/AUTO DIFF WBC: CPT

## 2021-05-12 PROCEDURE — 83615 LACTATE (LD) (LDH) ENZYME: CPT

## 2021-05-12 NOTE — DISCHARGE INSTRUCTIONS
Call your provider if:   You notice an increase in swelling or excessive epigastric pain.  You have a constant headache not relieved by Tylenol.  You have dizziness while carrying out activities of daily living.  You have changes in vision.  You start having regular painful contractions every 5 minutes or less for 1 hour. Time your contractions from the beginning of one to the beginning of the next one.   Your baby is not moving as much as usual-at least 4 fetal movements in 1 hour after drinking and resting on your side for 1 hour.   You have a gush of fluid or blood from your vagina (it is normal to have spotting after vaginal exam or intercourse). Collect your 24 hour urine. It started today (5/12/21) at 2:15pm. Empty the urine hat into the jug after each urination and keep the jug cool on ice. Return the urine to the office tomorrow (Thursday 5/13/21) after final collection of urine at 2:15pm.    The office will call you with the time of your appointment for Friday at the office.

## 2021-05-12 NOTE — PROGRESS NOTES
1240 Bedside, Verbal and Written shift change report given to MADDY Victor RN (oncoming nurse) by Mike Tobar. Tomy Montelongo RN (offgoing nurse). Report included the following information SBAR, MAR, Accordion, Recent Results, Med Rec Status and Alarm Parameters . 0 Dr. Melanie Ross at bedside discussing BPs and lab results and discussing plan of care. Plan for 24 hr urine, 24 urine to be turned in to the office tomorrow afternoon, repeat ultrasound for TAM check and office check on Friday. The office is to call with time for visit on Friday. 1505 Reviewed discharge instructions with patient and family via teach back. Patient verbalized understanding. Plan for follow up appointment with the office on Friday.

## 2021-05-12 NOTE — H&P
History & Physical    Name: Amira Vargas MRN: [id number]  SSN: [id number]    YOB: 1985  Age: 37 y.o. Sex: female        Subjective:     Estimated Date of Delivery: 21  OB History    Para Term  AB Living   1             SAB TAB Ectopic Molar Multiple Live Births                    # Outcome Date GA Lbr Kevin/2nd Weight Sex Delivery Anes PTL Lv   1 Current              34yo G1 2 37w0d sent from the office with elevated BPs. The patient presented for her routine OB visit today and was noted to have severe range BPs. She's had mild headaches for the last several weeks (too mild to even take tylenol), reports stars in her vision only when she's in a hot shower since 28 weeks (no symptoms currently), and denies RUQ pain. She has been monitoring her BPs closely this pregnancy due to concern for white coat hypertension (she's been noted to have mild range BPs in the office since 10 weeks) - BPs at home are always normal.  She's been 120s/80s at home in the last week. Patient denies labor symptoms, LOF, bleeding.        Past Medical History:   Diagnosis Date    Abnormal Pap smear     Abnormal Papanicolaou smear of cervix 2012    LEEP    Complication of anesthesia     nausea and vomiting    Psychiatric disorder     ANXIETY/DEPRESSION; no medications    Thyroid disease     hypothyroidism     Past Surgical History:   Procedure Laterality Date    HX HEENT      WISDOM TEETH    HX ORTHOPAEDIC  1998    A/S RIGHT HIP    HX OTHER SURGICAL  2013    Rig hip surgeries x2     Social History     Occupational History    Not on file   Tobacco Use    Smoking status: Never Smoker    Smokeless tobacco: Never Used   Substance and Sexual Activity    Alcohol use: Not Currently     Alcohol/week: 1.7 standard drinks     Types: 2 Glasses of wine per week    Drug use: Not Currently    Sexual activity: Not on file     Family History   Problem Relation Age of Onset    Cancer Maternal Grandmother BREAST CA    Cancer Maternal Grandfather         PANCREATIC CA    Heart Disease Paternal Grandfather         CHF    Heart Disease Paternal Uncle         CABG, CAD     Allergies   Allergen Reactions    Amoxicillin Other (comments)     VOMITING    Erythromycin Rash    Penicillin G Other (comments)     VOMITING    Sulfa (Sulfonamide Antibiotics) Rash     Prior to Admission medications    Medication Sig Start Date End Date Taking? Authorizing Provider   PNV BU.58/OCZJTOZ fum/folic ac (PRENATAL PO) Take  by mouth. Yes Provider, Historical   cholecalciferol, vitD3,/vit K2 (VITAMIN D3-VITAMIN K2 PO) Take  by mouth. Yes Provider, Historical   zinc 50 mg tab tablet Take  by mouth daily. Yes Provider, Historical   doxylamine succinate (UNISOM) 25 mg tablet Take 10 mg by mouth nightly as needed. Yes Provider, Historical   levothyroxine (SYNTHROID) 75 mcg tablet Take 50 mcg by mouth nightly. Yes Provider, Historical   LACTOBACILLUS COMBO NO.6 (PROBIOTIC COMPLEX PO) Take 1 Tab by mouth daily. Yes Provider, Historical   magnesium gluconate 500 mg (27 mg  elemental) tablet Take 250 mg by mouth daily. Provider, Historical        Review of Systems negative    Objective:     Vitals:  Vitals:    05/12/21 1323 05/12/21 1333 05/12/21 1343 05/12/21 1353   BP: 126/78 129/82 127/78 (!) 133/90   Pulse: 75 75 80 81   Weight:       Height:            Physical Exam  GEN: NAD, resting comfortably  Pulm: no resp distress  Abd: soft, gravid, NT  : deferred (cervix 3cm in the office)  Ext: no edema      Fetal Heart Rate: Tocometry: occasional contraction      Prenatal Labs:   Lab Results   Component Value Date/Time    HBsAg, External NON REACTIVE 10/26/2020    HIV, External NON REACTIVE 10/26/2020    Gonorrhea, External NEGATIVE 10/26/2020    Chlamydia, External NEGATIVE 10/26/2020    ABO,Rh A POSITIVE 10/26/2020          Impression/Plan:     34yo G1 @ 37w0d sent from the office to be evaluated for PreEclampsia. 1. Elevated BP: patient has been followed for suspected white coat hypertension. BPs in the office today were above her baseline. On arrival to L&D, she had a 146/96, but then BPs normalized and ran 126-138/75-89. PreEclampsia labs were essentially normal for this stage of pregnancy - P/C too low to calculate, liver enzymes normal, Hgb normal, platelets 855,630. As an isolated abnormal, this is more likely gestational thrombocytopenia. At home, BPs have been normal.  Discussed with Dr. Nraen Irvin - recommend patient continue to monitor BPs at home. Collect 24 hr urine and turn in tomorrow at [facility]. Plan f/u appt Friday with repeat BPP (borderline TAM today but MVP>2cm). Patient encouraged to hydrate. Preeclampsia precautions reviewed. She'll call sooner with any change in her clinical status. 2. IUP: reassuring fetal surveillance. BPP 10/10 today. Normal fetal growth today. Diso: discharge home. PreE precautions. Turn in 24hr urine tomorrow and plan BPP and BP check Friday. Addendum - after plan was made and discussed with patient, she had a /90 as I was in the room discussing precautions with her and the possibility of needing an IOL. The current plan is still reasonable and patient is comfortable with the plan. D/C home, F/U 2 days.

## 2021-05-13 ENCOUNTER — HOSPITAL ENCOUNTER (INPATIENT)
Age: 36
LOS: 3 days | Discharge: HOME | End: 2021-05-16
Attending: OBSTETRICS & GYNECOLOGY | Admitting: OBSTETRICS & GYNECOLOGY
Payer: COMMERCIAL

## 2021-05-13 PROBLEM — Z34.90 PREGNANCY: Status: ACTIVE | Noted: 2021-05-13

## 2021-05-13 LAB
ALBUMIN SERPL-MCNC: 3.3 G/DL (ref 3.5–5)
ALBUMIN/GLOB SERPL: 0.9 {RATIO} (ref 1.1–2.2)
ALP SERPL-CCNC: 192 U/L (ref 45–117)
ALT SERPL-CCNC: 29 U/L (ref 12–78)
ANION GAP SERPL CALC-SCNC: 6 MMOL/L (ref 5–15)
AST SERPL-CCNC: 28 U/L (ref 15–37)
BILIRUB SERPL-MCNC: 0.3 MG/DL (ref 0.2–1)
BUN SERPL-MCNC: 10 MG/DL (ref 6–20)
BUN/CREAT SERPL: 17 (ref 12–20)
CALCIUM SERPL-MCNC: 9.6 MG/DL (ref 8.5–10.1)
CHLORIDE SERPL-SCNC: 106 MMOL/L (ref 97–108)
CO2 SERPL-SCNC: 25 MMOL/L (ref 21–32)
CREAT SERPL-MCNC: 0.59 MG/DL (ref 0.55–1.02)
CREAT UR-MCNC: 18.4 MG/DL
GLOBULIN SER CALC-MCNC: 3.6 G/DL (ref 2–4)
GLUCOSE SERPL-MCNC: 76 MG/DL (ref 65–100)
LDH SERPL L TO P-CCNC: 222 U/L (ref 81–246)
POTASSIUM SERPL-SCNC: 4.4 MMOL/L (ref 3.5–5.1)
PROT SERPL-MCNC: 6.9 G/DL (ref 6.4–8.2)
PROT UR-MCNC: 6 MG/DL (ref 0–11.9)
PROT/CREAT UR-RTO: 0.3
SODIUM SERPL-SCNC: 137 MMOL/L (ref 136–145)
URATE SERPL-MCNC: 6.3 MG/DL (ref 2.6–6)

## 2021-05-13 PROCEDURE — 65270000029 HC RM PRIVATE

## 2021-05-13 PROCEDURE — 85025 COMPLETE CBC W/AUTO DIFF WBC: CPT

## 2021-05-13 PROCEDURE — 80053 COMPREHEN METABOLIC PANEL: CPT

## 2021-05-13 PROCEDURE — 36415 COLL VENOUS BLD VENIPUNCTURE: CPT

## 2021-05-13 PROCEDURE — 83615 LACTATE (LD) (LDH) ENZYME: CPT

## 2021-05-13 PROCEDURE — 84156 ASSAY OF PROTEIN URINE: CPT

## 2021-05-13 PROCEDURE — 74011250637 HC RX REV CODE- 250/637: Performed by: ADVANCED PRACTICE MIDWIFE

## 2021-05-13 PROCEDURE — 84550 ASSAY OF BLOOD/URIC ACID: CPT

## 2021-05-13 PROCEDURE — 99285 EMERGENCY DEPT VISIT HI MDM: CPT

## 2021-05-13 RX ORDER — SODIUM CHLORIDE 0.9 % (FLUSH) 0.9 %
5-40 SYRINGE (ML) INJECTION EVERY 8 HOURS
Status: DISCONTINUED | OUTPATIENT
Start: 2021-05-14 | End: 2021-05-14 | Stop reason: HOSPADM

## 2021-05-13 RX ORDER — ACETAMINOPHEN 325 MG/1
650 TABLET ORAL
Status: COMPLETED | OUTPATIENT
Start: 2021-05-13 | End: 2021-05-13

## 2021-05-13 RX ORDER — OXYTOCIN/RINGER'S LACTATE 30/500 ML
10 PLASTIC BAG, INJECTION (ML) INTRAVENOUS AS NEEDED
Status: DISCONTINUED | OUTPATIENT
Start: 2021-05-13 | End: 2021-05-14 | Stop reason: HOSPADM

## 2021-05-13 RX ORDER — OXYTOCIN/RINGER'S LACTATE 30/500 ML
87.3 PLASTIC BAG, INJECTION (ML) INTRAVENOUS AS NEEDED
Status: DISCONTINUED | OUTPATIENT
Start: 2021-05-13 | End: 2021-05-14 | Stop reason: HOSPADM

## 2021-05-13 RX ORDER — NALOXONE HYDROCHLORIDE 0.4 MG/ML
0.4 INJECTION, SOLUTION INTRAMUSCULAR; INTRAVENOUS; SUBCUTANEOUS AS NEEDED
Status: DISCONTINUED | OUTPATIENT
Start: 2021-05-13 | End: 2021-05-14 | Stop reason: HOSPADM

## 2021-05-13 RX ORDER — SODIUM CHLORIDE 0.9 % (FLUSH) 0.9 %
5-40 SYRINGE (ML) INJECTION AS NEEDED
Status: DISCONTINUED | OUTPATIENT
Start: 2021-05-13 | End: 2021-05-14 | Stop reason: HOSPADM

## 2021-05-13 RX ORDER — SODIUM CHLORIDE, SODIUM LACTATE, POTASSIUM CHLORIDE, CALCIUM CHLORIDE 600; 310; 30; 20 MG/100ML; MG/100ML; MG/100ML; MG/100ML
125 INJECTION, SOLUTION INTRAVENOUS CONTINUOUS
Status: DISCONTINUED | OUTPATIENT
Start: 2021-05-14 | End: 2021-05-14 | Stop reason: HOSPADM

## 2021-05-13 RX ORDER — MAG HYDROX/ALUMINUM HYD/SIMETH 200-200-20
30 SUSPENSION, ORAL (FINAL DOSE FORM) ORAL
Status: DISCONTINUED | OUTPATIENT
Start: 2021-05-13 | End: 2021-05-14 | Stop reason: HOSPADM

## 2021-05-13 RX ADMIN — ACETAMINOPHEN 650 MG: 325 TABLET ORAL at 21:57

## 2021-05-14 ENCOUNTER — ANESTHESIA (OUTPATIENT)
Dept: LABOR AND DELIVERY | Age: 36
End: 2021-05-14
Payer: COMMERCIAL

## 2021-05-14 ENCOUNTER — ANESTHESIA EVENT (OUTPATIENT)
Dept: LABOR AND DELIVERY | Age: 36
End: 2021-05-14
Payer: COMMERCIAL

## 2021-05-14 LAB
COVID-19 RAPID TEST, COVR: NOT DETECTED
SARS-COV-2, COV2: NORMAL
SOURCE, COVRS: NORMAL

## 2021-05-14 PROCEDURE — 76010000389 HC LDRP PROCEDURE 0.5 TO 1 HR: Performed by: OBSTETRICS & GYNECOLOGY

## 2021-05-14 PROCEDURE — 74011000250 HC RX REV CODE- 250: Performed by: ANESTHESIOLOGY

## 2021-05-14 PROCEDURE — 74011250636 HC RX REV CODE- 250/636: Performed by: ADVANCED PRACTICE MIDWIFE

## 2021-05-14 PROCEDURE — 75410000003 HC RECOV DEL/VAG/CSECN EA 0.5 HR: Performed by: OBSTETRICS & GYNECOLOGY

## 2021-05-14 PROCEDURE — A4300 CATH IMPL VASC ACCESS PORTAL: HCPCS

## 2021-05-14 PROCEDURE — 74011250637 HC RX REV CODE- 250/637: Performed by: ADVANCED PRACTICE MIDWIFE

## 2021-05-14 PROCEDURE — 59200 INSERT CERVICAL DILATOR: CPT

## 2021-05-14 PROCEDURE — 74011000258 HC RX REV CODE- 258: Performed by: ADVANCED PRACTICE MIDWIFE

## 2021-05-14 PROCEDURE — 77030040830 HC CATH URETH FOL MDII -A

## 2021-05-14 PROCEDURE — 74011250637 HC RX REV CODE- 250/637: Performed by: OBSTETRICS & GYNECOLOGY

## 2021-05-14 PROCEDURE — 75410000000 HC DELIVERY VAGINAL/SINGLE

## 2021-05-14 PROCEDURE — 0KQM0ZZ REPAIR PERINEUM MUSCLE, OPEN APPROACH: ICD-10-PCS | Performed by: OBSTETRICS & GYNECOLOGY

## 2021-05-14 PROCEDURE — 74011250636 HC RX REV CODE- 250/636: Performed by: ANESTHESIOLOGY

## 2021-05-14 PROCEDURE — 76060000078 HC EPIDURAL ANESTHESIA

## 2021-05-14 PROCEDURE — 74011250636 HC RX REV CODE- 250/636: Performed by: NURSE ANESTHETIST, CERTIFIED REGISTERED

## 2021-05-14 PROCEDURE — 65410000002 HC RM PRIVATE OB

## 2021-05-14 PROCEDURE — 77030002933 HC SUT MCRYL J&J -A

## 2021-05-14 PROCEDURE — 00HU33Z INSERTION OF INFUSION DEVICE INTO SPINAL CANAL, PERCUTANEOUS APPROACH: ICD-10-PCS | Performed by: ANESTHESIOLOGY

## 2021-05-14 PROCEDURE — 75410000003 HC RECOV DEL/VAG/CSECN EA 0.5 HR

## 2021-05-14 PROCEDURE — 76060000078 HC EPIDURAL ANESTHESIA: Performed by: OBSTETRICS & GYNECOLOGY

## 2021-05-14 PROCEDURE — 75410000002 HC LABOR FEE PER 1 HR

## 2021-05-14 PROCEDURE — 36415 COLL VENOUS BLD VENIPUNCTURE: CPT

## 2021-05-14 PROCEDURE — 77030031139 HC SUT VCRL2 J&J -A

## 2021-05-14 PROCEDURE — 87635 SARS-COV-2 COVID-19 AMP PRB: CPT

## 2021-05-14 PROCEDURE — 0UQGXZZ REPAIR VAGINA, EXTERNAL APPROACH: ICD-10-PCS | Performed by: OBSTETRICS & GYNECOLOGY

## 2021-05-14 PROCEDURE — 0DQP0ZZ REPAIR RECTUM, OPEN APPROACH: ICD-10-PCS | Performed by: OBSTETRICS & GYNECOLOGY

## 2021-05-14 RX ORDER — SODIUM CHLORIDE 0.9 % (FLUSH) 0.9 %
5-40 SYRINGE (ML) INJECTION AS NEEDED
Status: DISCONTINUED | OUTPATIENT
Start: 2021-05-14 | End: 2021-05-16 | Stop reason: HOSPADM

## 2021-05-14 RX ORDER — FENTANYL CITRATE 50 UG/ML
100 INJECTION, SOLUTION INTRAMUSCULAR; INTRAVENOUS ONCE
Status: DISCONTINUED | OUTPATIENT
Start: 2021-05-14 | End: 2021-05-14 | Stop reason: HOSPADM

## 2021-05-14 RX ORDER — CEFAZOLIN SODIUM 1 G/3ML
INJECTION, POWDER, FOR SOLUTION INTRAMUSCULAR; INTRAVENOUS AS NEEDED
Status: DISCONTINUED | OUTPATIENT
Start: 2021-05-14 | End: 2021-05-14 | Stop reason: HOSPADM

## 2021-05-14 RX ORDER — SODIUM CHLORIDE 0.9 % (FLUSH) 0.9 %
5-40 SYRINGE (ML) INJECTION EVERY 8 HOURS
Status: DISCONTINUED | OUTPATIENT
Start: 2021-05-14 | End: 2021-05-16 | Stop reason: HOSPADM

## 2021-05-14 RX ORDER — FENTANYL/BUPIVACAINE/NS/PF 2-1250MCG
1-16 PREFILLED PUMP RESERVOIR EPIDURAL CONTINUOUS
Status: DISCONTINUED | OUTPATIENT
Start: 2021-05-14 | End: 2021-05-16 | Stop reason: HOSPADM

## 2021-05-14 RX ORDER — DIPHENHYDRAMINE HYDROCHLORIDE 50 MG/ML
12.5 INJECTION, SOLUTION INTRAMUSCULAR; INTRAVENOUS
Status: DISCONTINUED | OUTPATIENT
Start: 2021-05-14 | End: 2021-05-16 | Stop reason: HOSPADM

## 2021-05-14 RX ORDER — DOCUSATE SODIUM 100 MG/1
100 CAPSULE, LIQUID FILLED ORAL 2 TIMES DAILY
Status: DISCONTINUED | OUTPATIENT
Start: 2021-05-14 | End: 2021-05-16 | Stop reason: HOSPADM

## 2021-05-14 RX ORDER — FENTANYL CITRATE 50 UG/ML
INJECTION, SOLUTION INTRAMUSCULAR; INTRAVENOUS
Status: COMPLETED
Start: 2021-05-14 | End: 2021-05-14

## 2021-05-14 RX ORDER — LIDOCAINE HYDROCHLORIDE AND EPINEPHRINE 15; 5 MG/ML; UG/ML
4.5 INJECTION, SOLUTION EPIDURAL AS NEEDED
Status: DISCONTINUED | OUTPATIENT
Start: 2021-05-14 | End: 2021-05-14 | Stop reason: HOSPADM

## 2021-05-14 RX ORDER — FENTANYL CITRATE 50 UG/ML
100 INJECTION, SOLUTION INTRAMUSCULAR; INTRAVENOUS ONCE
Status: COMPLETED | OUTPATIENT
Start: 2021-05-14 | End: 2021-05-14

## 2021-05-14 RX ORDER — HYDROCORTISONE 1 %
CREAM (GRAM) TOPICAL AS NEEDED
Status: DISCONTINUED | OUTPATIENT
Start: 2021-05-14 | End: 2021-05-16 | Stop reason: HOSPADM

## 2021-05-14 RX ORDER — IBUPROFEN 400 MG/1
800 TABLET ORAL EVERY 8 HOURS
Status: DISCONTINUED | OUTPATIENT
Start: 2021-05-14 | End: 2021-05-16 | Stop reason: HOSPADM

## 2021-05-14 RX ORDER — HYDROCORTISONE ACETATE PRAMOXINE HCL 2.5; 1 G/100G; G/100G
CREAM TOPICAL AS NEEDED
Status: DISCONTINUED | OUTPATIENT
Start: 2021-05-14 | End: 2021-05-16 | Stop reason: HOSPADM

## 2021-05-14 RX ORDER — ONDANSETRON 2 MG/ML
4 INJECTION INTRAMUSCULAR; INTRAVENOUS
Status: DISCONTINUED | OUTPATIENT
Start: 2021-05-14 | End: 2021-05-15

## 2021-05-14 RX ORDER — NALOXONE HYDROCHLORIDE 0.4 MG/ML
0.4 INJECTION, SOLUTION INTRAMUSCULAR; INTRAVENOUS; SUBCUTANEOUS AS NEEDED
Status: DISCONTINUED | OUTPATIENT
Start: 2021-05-14 | End: 2021-05-14 | Stop reason: HOSPADM

## 2021-05-14 RX ORDER — OXYCODONE AND ACETAMINOPHEN 5; 325 MG/1; MG/1
1 TABLET ORAL
Status: DISCONTINUED | OUTPATIENT
Start: 2021-05-14 | End: 2021-05-16 | Stop reason: HOSPADM

## 2021-05-14 RX ORDER — DOCUSATE SODIUM 100 MG/1
100 CAPSULE, LIQUID FILLED ORAL
Status: DISCONTINUED | OUTPATIENT
Start: 2021-05-14 | End: 2021-05-16 | Stop reason: HOSPADM

## 2021-05-14 RX ORDER — EPHEDRINE SULFATE/0.9% NACL/PF 50 MG/5 ML
10 SYRINGE (ML) INTRAVENOUS
Status: COMPLETED | OUTPATIENT
Start: 2021-05-14 | End: 2021-05-14

## 2021-05-14 RX ORDER — LIDOCAINE HYDROCHLORIDE 10 MG/ML
INJECTION INFILTRATION; PERINEURAL
Status: DISPENSED
Start: 2021-05-14 | End: 2021-05-15

## 2021-05-14 RX ORDER — BUPIVACAINE HYDROCHLORIDE 2.5 MG/ML
INJECTION, SOLUTION EPIDURAL; INFILTRATION; INTRACAUDAL AS NEEDED
Status: DISCONTINUED | OUTPATIENT
Start: 2021-05-14 | End: 2021-05-14 | Stop reason: HOSPADM

## 2021-05-14 RX ORDER — SIMETHICONE 80 MG
80 TABLET,CHEWABLE ORAL
Status: DISCONTINUED | OUTPATIENT
Start: 2021-05-14 | End: 2021-05-16 | Stop reason: HOSPADM

## 2021-05-14 RX ORDER — BUPIVACAINE HYDROCHLORIDE 5 MG/ML
30 INJECTION, SOLUTION EPIDURAL; INTRACAUDAL AS NEEDED
Status: DISCONTINUED | OUTPATIENT
Start: 2021-05-14 | End: 2021-05-14 | Stop reason: HOSPADM

## 2021-05-14 RX ORDER — OXYTOCIN/RINGER'S LACTATE 30/500 ML
10 PLASTIC BAG, INJECTION (ML) INTRAVENOUS AS NEEDED
Status: DISCONTINUED | OUTPATIENT
Start: 2021-05-14 | End: 2021-05-16 | Stop reason: HOSPADM

## 2021-05-14 RX ORDER — SENNOSIDES 8.6 MG/1
1 TABLET ORAL DAILY PRN
Status: DISCONTINUED | OUTPATIENT
Start: 2021-05-14 | End: 2021-05-16 | Stop reason: HOSPADM

## 2021-05-14 RX ORDER — OXYTOCIN/RINGER'S LACTATE 30/500 ML
87.3 PLASTIC BAG, INJECTION (ML) INTRAVENOUS AS NEEDED
Status: DISCONTINUED | OUTPATIENT
Start: 2021-05-14 | End: 2021-05-16 | Stop reason: HOSPADM

## 2021-05-14 RX ORDER — SODIUM CHLORIDE, SODIUM LACTATE, POTASSIUM CHLORIDE, CALCIUM CHLORIDE 600; 310; 30; 20 MG/100ML; MG/100ML; MG/100ML; MG/100ML
INJECTION, SOLUTION INTRAVENOUS
Status: DISCONTINUED | OUTPATIENT
Start: 2021-05-14 | End: 2021-05-14 | Stop reason: HOSPADM

## 2021-05-14 RX ORDER — AMMONIA 15 % (W/V)
1 AMPUL (EA) INHALATION AS NEEDED
Status: DISCONTINUED | OUTPATIENT
Start: 2021-05-14 | End: 2021-05-16 | Stop reason: HOSPADM

## 2021-05-14 RX ORDER — BUTORPHANOL TARTRATE 2 MG/ML
2 INJECTION INTRAMUSCULAR; INTRAVENOUS
Status: DISCONTINUED | OUTPATIENT
Start: 2021-05-14 | End: 2021-05-14

## 2021-05-14 RX ORDER — OXYTOCIN/RINGER'S LACTATE 30/500 ML
1-25 PLASTIC BAG, INJECTION (ML) INTRAVENOUS
Status: DISCONTINUED | OUTPATIENT
Start: 2021-05-14 | End: 2021-05-16 | Stop reason: HOSPADM

## 2021-05-14 RX ORDER — PROPOFOL 10 MG/ML
INJECTION, EMULSION INTRAVENOUS AS NEEDED
Status: DISCONTINUED | OUTPATIENT
Start: 2021-05-14 | End: 2021-05-14 | Stop reason: HOSPADM

## 2021-05-14 RX ORDER — BUPIVACAINE HYDROCHLORIDE 2.5 MG/ML
INJECTION, SOLUTION EPIDURAL; INFILTRATION; INTRACAUDAL
Status: COMPLETED
Start: 2021-05-14 | End: 2021-05-14

## 2021-05-14 RX ORDER — BUTORPHANOL TARTRATE 1 MG/ML
1 INJECTION INTRAMUSCULAR; INTRAVENOUS
Status: DISCONTINUED | OUTPATIENT
Start: 2021-05-14 | End: 2021-05-16 | Stop reason: HOSPADM

## 2021-05-14 RX ORDER — LEVOTHYROXINE SODIUM 50 UG/1
50 TABLET ORAL
Status: DISCONTINUED | OUTPATIENT
Start: 2021-05-14 | End: 2021-05-16 | Stop reason: HOSPADM

## 2021-05-14 RX ORDER — KETOROLAC TROMETHAMINE 30 MG/ML
INJECTION, SOLUTION INTRAMUSCULAR; INTRAVENOUS AS NEEDED
Status: DISCONTINUED | OUTPATIENT
Start: 2021-05-14 | End: 2021-05-14 | Stop reason: HOSPADM

## 2021-05-14 RX ADMIN — Medication 10 ML/HR: at 10:38

## 2021-05-14 RX ADMIN — OXYTOCIN 2 MILLI-UNITS/MIN: 10 INJECTION INTRAVENOUS at 06:29

## 2021-05-14 RX ADMIN — LEVOTHYROXINE SODIUM 50 MCG: 0.05 TABLET ORAL at 08:22

## 2021-05-14 RX ADMIN — KETOROLAC TROMETHAMINE 30 MG: 30 INJECTION, SOLUTION INTRAMUSCULAR; INTRAVENOUS at 14:08

## 2021-05-14 RX ADMIN — SODIUM CHLORIDE, POTASSIUM CHLORIDE, SODIUM LACTATE AND CALCIUM CHLORIDE: 600; 310; 30; 20 INJECTION, SOLUTION INTRAVENOUS at 13:15

## 2021-05-14 RX ADMIN — BUTORPHANOL TARTRATE 1 MG: 1 INJECTION, SOLUTION INTRAMUSCULAR; INTRAVENOUS at 03:25

## 2021-05-14 RX ADMIN — Medication 50 MG: at 11:10

## 2021-05-14 RX ADMIN — FENTANYL CITRATE 100 MCG: 50 INJECTION, SOLUTION INTRAMUSCULAR; INTRAVENOUS at 10:23

## 2021-05-14 RX ADMIN — PROPOFOL 50 MG: 10 INJECTION, EMULSION INTRAVENOUS at 13:57

## 2021-05-14 RX ADMIN — SODIUM CHLORIDE, POTASSIUM CHLORIDE, SODIUM LACTATE AND CALCIUM CHLORIDE 125 ML/HR: 600; 310; 30; 20 INJECTION, SOLUTION INTRAVENOUS at 03:00

## 2021-05-14 RX ADMIN — PROPOFOL 50 MG: 10 INJECTION, EMULSION INTRAVENOUS at 13:53

## 2021-05-14 RX ADMIN — CEFAZOLIN 2 G: 330 INJECTION, POWDER, FOR SOLUTION INTRAMUSCULAR; INTRAVENOUS at 13:32

## 2021-05-14 RX ADMIN — MISOPROSTOL 50 MCG: 100 TABLET ORAL at 00:57

## 2021-05-14 RX ADMIN — SODIUM CHLORIDE, POTASSIUM CHLORIDE, SODIUM LACTATE AND CALCIUM CHLORIDE 125 ML/HR: 600; 310; 30; 20 INJECTION, SOLUTION INTRAVENOUS at 13:15

## 2021-05-14 RX ADMIN — DOCUSATE SODIUM 100 MG: 100 CAPSULE, LIQUID FILLED ORAL at 18:28

## 2021-05-14 RX ADMIN — IBUPROFEN 800 MG: 400 TABLET, FILM COATED ORAL at 16:38

## 2021-05-14 RX ADMIN — PROMETHAZINE HYDROCHLORIDE 25 MG: 25 INJECTION INTRAMUSCULAR; INTRAVENOUS at 03:27

## 2021-05-14 RX ADMIN — PROPOFOL 50 MG: 10 INJECTION, EMULSION INTRAVENOUS at 13:30

## 2021-05-14 RX ADMIN — BUPIVACAINE HYDROCHLORIDE 10 ML: 2.5 INJECTION, SOLUTION EPIDURAL; INFILTRATION; INTRACAUDAL; PERINEURAL at 10:23

## 2021-05-14 RX ADMIN — Medication 50 MG: at 10:49

## 2021-05-14 RX ADMIN — LIDOCAINE HYDROCHLORIDE 3 ML: 10; .005 INJECTION, SOLUTION EPIDURAL; INFILTRATION; INTRACAUDAL; PERINEURAL at 10:21

## 2021-05-14 NOTE — ANESTHESIA POSTPROCEDURE EVALUATION
Post-Anesthesia Evaluation and Assessment    Patient: Xander Patrick MRN: [id number]  SSN: [id number]    YOB: 1985  Age: 37 y.o. Sex: female      I have evaluated the patient and they are stable and ready for discharge from the PACU. Cardiovascular Function/Vital Signs  Visit Vitals  /78   Pulse (!) 107   Temp 36.8 °C (98.2 °F)   Resp 16   Ht 5' 8\" (1.727 m)   Wt 76.2 kg (168 lb)   SpO2 100%   Breastfeeding No   BMI 25.54 kg/m²       Patient is status post Epidural anesthesia for Procedure(s):  VAGINAL EXAMINATION UNDER ANESTHESIA. Nausea/Vomiting: None    Postoperative hydration reviewed and adequate. Pain:  Pain Scale 1: Labor Algorithm/Pain Intensity (05/14/21 1058)  Pain Intensity 1: 5 (05/14/21 0200)   Managed    Neurological Status:   Neuro (WDL): Within Defined Limits (05/14/21 0821)   At baseline    Mental Status, Level of Consciousness: Alert and  oriented to person, place, and time    Pulmonary Status:       Adequate oxygenation and airway patent    Complications related to anesthesia: None    Post-anesthesia assessment completed. No concerns    Signed By: Jay Kauffman MD     May 14, 2021            Post-Anesthesia Evaluation and Assessment    Patient: Xander Patrick MRN: [id number]  SSN: [id number]    YOB: 1985  Age: 37 y.o. Sex: female      I have evaluated the patient and they are stable and ready for discharge from the PACU. Cardiovascular Function/Vital Signs  Visit Vitals  BP (!) 129/90   Pulse 89   Temp 36.8 °C (98.2 °F)   Resp 16   Ht 5' 8\" (1.727 m)   Wt 76.2 kg (168 lb)   SpO2 100%   Breastfeeding No   BMI 25.54 kg/m²       Patient is status post Epidural anesthesia for Procedure(s):  VAGINAL EXAMINATION UNDER ANESTHESIA. Nausea/Vomiting: None    Postoperative hydration reviewed and adequate.     Pain:  Pain Scale 1: Labor Algorithm/Pain Intensity (05/14/21 1058)  Pain Intensity 1: 5 (05/14/21 0200)   Managed    Neurological Status:   Neuro (WDL): Within Defined Limits (05/14/21 0821)   At baseline    Mental Status, Level of Consciousness: Alert and  oriented to person, place, and time    Pulmonary Status:       Adequate oxygenation and airway patent    Complications related to anesthesia: None    Post-anesthesia assessment completed. No concerns    Signed By: Azeem Pierson MD     May 14, 2021              Procedure(s):  VAGINAL EXAMINATION UNDER ANESTHESIA.    epidural    <BSHSIANPOST>    INITIAL Post-op Vital signs:   Vitals Value Taken Time   /78 05/14/21 1450   Temp     Pulse 107 05/14/21 1450   Resp     SpO2     Vitals shown include unvalidated device data.

## 2021-05-14 NOTE — PROGRESS NOTES
0020 - Bedside and Verbal shift change report given to AREN Qiu RN (oncoming nurse) by Tom Zaldivar RNC (offgoing nurse). Report included the following information SBAR, Kardex, MAR, Recent Results and Med Rec Status. VENUS Sanchespeytonzachariah Gould plans to place a cervical ripening balloon shortly (plus a dose of Cytotec). Pt teaching done.

## 2021-05-14 NOTE — L&D DELIVERY NOTE
Delivery Summary  Patient: Emanuel Mosher             Circumcision:   Desires \"Delroy\"  Additional Delivery Comments - patient pushed with baby's head on perineum for extended period of time. Long perineal body noted and suspected to be cause. While pushing, I felt a sudden gush of fluid with hand supporting perineum, and noted a hole in the perineum, near the anus. My concern became that baby was trying to exit along this path, so I recommended an episiotomy for delivery, and patient agreed. With next contraction, a right mediolateral episiotomy was cut, and baby delivered with that push- nuchal cord and arm noted, easily delivered through. Baby to mom's chest, delayed cord clamp and cut by FOB. Exam in the room revealed an extensive laceration, and it was unclear whether it was a complete third degree laceration, so patient was taken to OR for better positioning and lighting. Exam in OR revealed that rectal mucosa was completely intact, and in fact the rectal sphincter was also intact. However the vaginal mucosa was torn anterior and posterior to the sphincter, and a finger placed in the vagina could exit near the hole made during pushing near patient's anus. With finger placed in rectum, the vaginal tear over the rectal mucosa, was repaired with 4-0 vicryl. The tissue was then reapproximated with 3-0 vicryl, to attach sphincter to vaginal tissue and perineal tissue. Attention was then turned to bilateral side wall lacerations that were repaired w 3-0 vicryl in standard fashion, the remaining second degree from episiotomy was also repaired in standard fashion. An anterior laceration between urethra (but not involving urethra) and clitoris was reapproximated for hemostasis w monocril. Monocril also then used to close the skin near the anus. A final rectal exam was performed revealing no suture in rectum, and a well developed perineal body, sphincter, and rectovaginal septum.     Patient tolerated well, received Ancef 2g in OR      Information for the patient's :  Balta Dunne [[id number]]       Labor Events:    Labor: No    Steroids: None   Cervical Ripening Date/Time:       Cervical Ripening Type:     Antibiotics During Labor:     Rupture Identifier: Sac 1    Rupture Date/Time: 2021 9:00 AM   Rupture Type: SROM   Amniotic Fluid Volume:      Amniotic Fluid Description: Clear    Amniotic Fluid Odor: None    Induction:         Induction Date/Time:        Indications for Induction:      Augmentation:     Augmentation Date/Time:      Indications for Augmentation:     Labor complications: Additional complications:        Delivery Events:  Indications For Episiotomy:     Episiotomy:     Perineal Laceration(s):     Repaired:     Periurethral Laceration Location:      Repaired:     Labial Laceration Location:     Repaired:     Sulcal Laceration Location:     Repaired:     Vaginal Laceration Location:     Repaired:     Cervical Laceration Location:     Repaired:     Repair Suture:     Number of Repair Packets:     Estimated Blood Loss (ml):  ml   Quantitative Blood Loss (ml)                Delivery Date: 2021    Delivery Time: 12:49 PM  Delivery Type: Vaginal, Spontaneous  Sex:  Male    Gestational Age: 42w2d   Delivery Clinician:     Living Status: Living   Delivery Location: L&D room 5          APGARS  One minute Five minutes Ten minutes   Skin color: 0   1        Heart rate: 2   2        Grimace: 2   2        Muscle tone: 2   2        Breathin   2        Totals: 7   9            Presentation: Vertex    Position:        Resuscitation Method:  Suctioning-bulb;Suctioning-deep; Tactile Stimulation     Meconium Stained: None      Cord Information: 3 Vessels  Complications:    Cord around:    Delayed cord clamping?     Cord clamped date/time:   Disposition of Cord Blood:      Blood Gases Sent?:      Placenta:  Date/Time:    Removal:        Appearance:        Measurements:  Birth Weight:        Birth Length:        Head Circumference:        Chest Circumference:       Abdominal Girth:       Other Providers:    , Obstetrician;Primary Nurse;Primary [place] Nurse;Nicu Nurse;Neonatologist;Anesthesiologist;Crna;Nurse Practitioner;Midwife;Nursery Nurse           Cord pH:  none    Episiotomy:    Laceration(s):      Estimated Blood Loss (ml):     Labor Events  Method:       Augmentation:    Cervical Ripening:             Hospital Problems  Date Reviewed: 2021          Codes Class Noted POA    Pregnancy ICD-10-CM: Z34.90  ICD-9-CM: V22.2  2021 Unknown              Operative Vaginal Delivery - none    Group B Strep:   Lab Results   Component Value Date/Time    GrBStrep, External negative 2021     Information for the patient's :  Anaispeyton Hansen [[id number]]   No results found for: ABORH, PCTABR, PCTDIG, BILI, ABORHEXT, ABORH     No results found for: APH, APCO2, APO2, AHCO3, ABEC, ABDC, O2ST, EPHV, PCO2V, PO2V, HCO3V, EBEV, EBDV, SITE, RSCOM

## 2021-05-14 NOTE — PROGRESS NOTES
0020 Report received for Izabela Melgar Rn    0047 ROCIO. Eri Parcel in room sve done will insert Cook catheter. 0300 requesting medication for pain explained side effects from medication. 0750 Bedside shift change report given to AREN Mandujano (oncoming nurse) by Angelina Cleveland Rn (offgoing nurse). Report included the following information SBAR, MAR and Med Rec Status.

## 2021-05-14 NOTE — ROUTINE PROCESS
TRANSFER - IN REPORT: 
 
46- Verbal report received from Vic Buitrago RN(name) on [facility]  being received from L&D(unit) for routine progression of care Report consisted of patients Situation, Background, Assessment and  
Recommendations(SBAR). Information from the following report(s) SBAR was reviewed with the receiving nurse. Opportunity for questions and clarification was provided. Assessment completed upon patients arrival to unit and care assumed.

## 2021-05-14 NOTE — PROGRESS NOTES
Labor Progress Note  Patient seen, fetal heart rate and contraction pattern evaluated. Contractions increased in intensity since SROM. around 0900    VSS, pitocin @ 8  Fetal Heart Rate: moderate variability  Accelerations: yes  Decelerations: no  Uterine Activity: q2min  Cervical Exam: cervica balloon removed, 6/100/-3      Assessment/Plan:  Reassuring fetal status. Epidural when desired.

## 2021-05-14 NOTE — PROGRESS NOTES
Labor Progress Note  Patient seen, fetal heart rate and contraction pattern evaluated. Crampy and tired. Denies HA/cp/sob/visual changes/ and ruq pain. VSS, pitocin @ 6  Fetal Heart Rate: moderate variability  Accelerations: yes  Decelerations: variable  Uterine Activity: q2-3min  Cervical Exam: deferred  Membranes:  Intact    Assessment/Plan:  Reassuring fetal status. Will continue current management. Pt being induced for preE, w/o severe features. BPs - normal today    Cervical balloonn nplaced 1am, will reassess at 12 hours.

## 2021-05-14 NOTE — ANESTHESIA PROCEDURE NOTES
Epidural Block    Patient location during procedure: OB  Reason for block: labor epidural  Staffing  Performed: attending   Preanesthetic Checklist  Completed: patient identified, IV checked, site marked, risks and benefits discussed, surgical consent, monitors and equipment checked, pre-op evaluation and timeout performed  Block Placement  Patient position: sitting  Prep: ChloraPrep  Sterility prep: cap, drape, gloves and mask  Sedation level: no sedation  Patient monitoring: continuous pulse oximetry and heart rate  Approach: midline  Location: lumbar  Lumbar location: L4-L5  Epidural  Loss of resistance technique: saline  Guidance: landmark technique  Needle  Needle type: Tuohy   Needle gauge: 17 G  Needle length: 9 cm  Needle insertion depth: 5 cm  Catheter type: end hole  Catheter size: 19 G  Catheter at skin depth: 10 cm  Catheter securement method: clear occlusive dressing, liquid medical adhesive and surgical tape  Test dose: negative  Assessment  Sensory level: T6  Block outcome: pain improved  Number of attempts: 1  Procedure assessment: patient tolerated procedure well with no complications

## 2021-05-14 NOTE — PROGRESS NOTES
[facility] Labor Progress Note     Patient: Emanuel Mosher MRN: [id number]  SSN: [id number]    YOB: 1985  Age: 37 y.o. Sex: female        Subjective:   Patient not feeling well after stadol. Reports she feels dizzy and nauseous.  remains at bedside providing support. Objective:   Blood pressure 136/86, pulse 73, temperature 97.9 °F (36.6 °C), resp. rate 16, height 5' 8\" (1.727 m), weight 76.2 kg (168 lb), not currently breastfeeding. Patient Vitals for the past 4 hrs: Mode Fetal Heart Rate Fetal Activity Variability Decelerations Accelerations RN Reviewed Strip?   21 0306 External 140 Present 6-25 BPM None Yes Yes   21 0200 External 130 Present 6-25 BPM None Yes Yes   21 0131 External 130 Present 6-25 BPM None Yes Yes   21 0039 External 130 Present 6-25 BPM None Yes Yes     Uterine contractions irregular, mild to palpation, resting tone soft    Sterile Vaginal Exam: deferred, fetal presentation vertex, membranes intact     Cook remains in place    Patient Vitals for the past 4 hrs:   Temp Pulse Resp BP   21 0133 97.9 °F (36.6 °C) 73 16 136/86         Assessment:     37w2d  Category 1 fetal heart rate tracing   IOL for Pre E w/o SF      Plan:   Offered reassurance and discussed when stadol should wear off and effect should fade. Offered zofran for nausea, pt does not desire at this time, but may want later. Continue with cook and pitocin at 5 am as scheduled.    Maternal and fetal monitoring per protocol  Anticipate MILLICENT Vasques CNM

## 2021-05-14 NOTE — PROGRESS NOTES
[facility] Labor Progress Note     Patient: Poncho Mcintyre MRN: [id number]  SSN: [id number]    YOB: 1985  Age: 37 y.o. Sex: female      Late entry, exam performed at 0053    Subjective:   Patient ready for SVE and cook placement.  remains at bedside providing support. Objective:   Blood pressure 136/86, pulse 73, temperature 98 °F (36.7 °C), resp. rate 12, height 5' 8\" (1.727 m), weight 76.2 kg (168 lb). , positive accels, no decels, moderate variability, ctx irregular, mild, resting tone soft    Sterile Vaginal Exam: 3 cm dilated/ 50 % effaced/ -2 station, cervix posterior , fetal presentation vertex, membranes intact     Attempted sterile speculum exam, unable to visualize cervix. Deidre Armin catheter placed digitally without complication, 87QW/[id number]. 50mcg cytotec placed vaginally (prior to inflation of vaginal balloon). Patient Vitals for the past 4 hrs:   Pulse BP   21 0133 73 136/86         Assessment:     37w2d  Category 1 fetal heart rate tracing   IOL for Pre E w/o SF    Plan:   Deidre Armin catheter placed without complication, pt and fetus tolerated well. Cytotec given. Pain medication PRN.    Pitocin at 5am.  Maternal and fetal monitoring per protocol   Anticipate MILLICENT Steward CNM

## 2021-05-14 NOTE — ED PROVIDER NOTES
Carol Forbes is a 38 yo G1 at 37w1d with an RICA of 6/2/21. She presents to the [facility] for headache and elevated BP. Reports she was home and started to have a headache, fluctuates between 4/10 and 8/10 on pain scale. She took her BP and it was 140/90s. Denies RUQ pain, but has had some nausea. Denies visual disturbances. Denies ctx, lof and vb, endorses good fetal movement. Prenatal care has been received at [facility] with Dr. Yoko Santizo. Pregnancy has been complicated by elevated BPs, GHTN until [facility] visit. LGA fetus 89% at 37 weeks. Pt has a brother with congenital heart defect (VSD), fetal echo was WNL. Pt has hypothyroidism, being followed by endocrine, on 50 mcg levothyroxine daily. Pt also with history of anxiety, stopped meds prior to pregnancy. The history is provided by the patient. Hypertension   This is a recurrent problem. The problem has been gradually worsening. Associated symptoms include headaches and nausea. There are no associated agents to hypertension. Risk factors: pregnancy.         Past Medical History:   Diagnosis Date    Abnormal Pap smear     Abnormal Papanicolaou smear of cervix 2012    LEEP    Complication of anesthesia     nausea and vomiting    Psychiatric disorder     ANXIETY/DEPRESSION; no medications    Thyroid disease     hypothyroidism       Past Surgical History:   Procedure Laterality Date    HX HEENT      WISDOM TEETH    HX ORTHOPAEDIC  1998    A/S RIGHT HIP    HX OTHER SURGICAL  2013    Right hip surgery     HX OTHER SURGICAL  1998    Right hip surgery (repaired torn labrum)         Family History:   Problem Relation Age of Onset    Cancer Maternal Grandmother         BREAST CA    Cancer Maternal Grandfather         PANCREATIC CA    Heart Disease Paternal Grandfather         CHF    Heart Disease Paternal Uncle         CABG, CAD       Social History     Socioeconomic History    Marital status:      Spouse name: Not on file    Number of children: Not on file  Years of education: Not on file    Highest education level: Not on file   Occupational History    Not on file   Social Needs    Financial resource strain: Not on file    Food insecurity     Worry: Not on file     Inability: Not on file    Transportation needs     Medical: Not on file     Non-medical: Not on file   Tobacco Use    Smoking status: Never Smoker    Smokeless tobacco: Never Used   Substance and Sexual Activity    Alcohol use: Not Currently     Alcohol/week: 1.7 standard drinks     Types: 2 Glasses of wine per week    Drug use: Not Currently    Sexual activity: Not on file   Lifestyle    Physical activity     Days per week: Not on file     Minutes per session: Not on file    Stress: Not on file   Relationships    Social connections     Talks on phone: Not on file     Gets together: Not on file     Attends Synagogue service: Not on file     Active member of club or organization: Not on file     Attends meetings of clubs or organizations: Not on file     Relationship status: Not on file    Intimate partner violence     Fear of current or ex partner: Not on file     Emotionally abused: Not on file     Physically abused: Not on file     Forced sexual activity: Not on file   Other Topics Concern     Service Not Asked    Blood Transfusions Not Asked    Caffeine Concern Not Asked    Occupational Exposure Not Asked   Lannis Anon Hazards Not Asked    Sleep Concern Not Asked    Stress Concern Not Asked    Weight Concern Not Asked    Special Diet Not Asked    Back Care Not Asked    Exercise Not Asked    Bike Helmet Not Asked    Seat Belt Not Asked    Self-Exams Not Asked   Social History Narrative    Not on file         ALLERGIES: Amoxicillin, Erythromycin, Penicillin g, and Sulfa (sulfonamide antibiotics)    Review of Systems   Constitutional: Negative. HENT: Negative. Eyes: Negative. Respiratory: Negative. Cardiovascular: Negative.     Gastrointestinal: Positive for nausea. Endocrine: Negative. Genitourinary: Negative. Musculoskeletal: Negative. Skin: Negative. Allergic/Immunologic: Negative. Neurological: Positive for headaches. Hematological: Negative. Psychiatric/Behavioral: Negative. Vitals:    05/13/21 2114 05/13/21 2123   BP: (!) 128/93 (!) 116/57   Pulse: 75 (!) 59   Resp: 15 12   Temp:  98 °F (36.7 °C)   Weight: 76.2 kg (168 lb)    Height: 5' 8\" (1.727 m)             Physical Exam  Vitals signs and nursing note reviewed. Exam conducted with a chaperone present. Constitutional:       Appearance: Normal appearance. She is normal weight. HENT:      Head: Normocephalic and atraumatic. Nose: Nose normal.      Mouth/Throat:      Mouth: Mucous membranes are moist.      Pharynx: Oropharynx is clear. Eyes:      Extraocular Movements: Extraocular movements intact. Neck:      Musculoskeletal: Normal range of motion and neck supple. Cardiovascular:      Rate and Rhythm: Normal rate and regular rhythm. Pulses: Normal pulses. Heart sounds: Normal heart sounds. Pulmonary:      Effort: Pulmonary effort is normal.      Breath sounds: Normal breath sounds. Abdominal:      Comments: Gravid, ctx mild to palpation, resting tone soft   Genitourinary:     Comments: SVE: Will complete inpatient  Musculoskeletal: Normal range of motion. Skin:     General: Skin is warm and dry. Capillary Refill: Capillary refill takes less than 2 seconds. Neurological:      General: No focal deficit present. Mental Status: She is alert and oriented to person, place, and time. Mental status is at baseline. Psychiatric:         Mood and Affect: Mood normal.         Behavior: Behavior normal.         Thought Content:  Thought content normal.         Judgment: Judgment normal.      NST: Monitored for 20 minutes, reactive, cat 1, baseline: 120, positive accels, no decels, moderate variability, ctx irregular, mild to palpation, resting toe soft    Patient Vitals for the past 4 hrs:   Temp Pulse Resp BP   05/13/21 2123 98 °F (36.7 °C) (!) 59 12 (!) 116/57   05/13/21 2114  75 15 (!) 128/93       Recent Results (from the past 12 hour(s))   PROTEIN/CREATININE RATIO, URINE    Collection Time: 05/13/21 10:21 PM   Result Value Ref Range    Protein, urine random 6 0.0 - 11.9 mg/dL    Creatinine, urine 18.40 mg/dL    Protein/Creat. urine Ratio 0.3     METABOLIC PANEL, COMPREHENSIVE    Collection Time: 05/13/21 10:21 PM   Result Value Ref Range    Sodium 137 136 - 145 mmol/L    Potassium 4.4 3.5 - 5.1 mmol/L    Chloride 106 97 - 108 mmol/L    CO2 25 21 - 32 mmol/L    Anion gap 6 5 - 15 mmol/L    Glucose 76 65 - 100 mg/dL    BUN 10 6 - 20 MG/DL    Creatinine 0.59 0.55 - 1.02 MG/DL    BUN/Creatinine ratio 17 12 - 20      GFR est AA >60 >60 ml/min/1.73m2    GFR est non-AA >60 >60 ml/min/1.73m2    Calcium 9.6 8.5 - 10.1 MG/DL    Bilirubin, total 0.3 0.2 - 1.0 MG/DL    ALT (SGPT) 29 12 - 78 U/L    AST (SGOT) 28 15 - 37 U/L    Alk.  phosphatase 192 (H) 45 - 117 U/L    Protein, total 6.9 6.4 - 8.2 g/dL    Albumin 3.3 (L) 3.5 - 5.0 g/dL    Globulin 3.6 2.0 - 4.0 g/dL    A-G Ratio 0.9 (L) 1.1 - 2.2     URIC ACID    Collection Time: 05/13/21 10:21 PM   Result Value Ref Range    Uric acid 6.3 (H) 2.6 - 6.0 MG/DL   LD    Collection Time: 05/13/21 10:21 PM   Result Value Ref Range     81 - 246 U/L       MDM  Number of Diagnoses or Management Options     Amount and/or Complexity of Data Reviewed  Clinical lab tests: ordered and reviewed  Decide to obtain previous medical records or to obtain history from someone other than the patient: yes  Review and summarize past medical records: yes  Independent visualization of images, tracings, or specimens: yes    Risk of Complications, Morbidity, and/or Mortality  Presenting problems: moderate  Diagnostic procedures: moderate  Management options: moderate    Critical Care  Total time providing critical care: > 105 minutes    Patient Progress  Patient progress: stable    ED Course as of May 13 2349   Thu May 13, 2021   2124 Admit to [facility]  NST  Pre E labs: CBC, CMP, Uric Acid, LDH, P/C ratio    [LA]   2346 Discussed p/c ratio of 0.3 (was unable to be calculated yesterday). Discussed now has diagnosis of Pre E w/o SF and need to proceed with IOL now since she is over 37 weeks. Pt agreeable. Admit to l and d for IOL. PROTEIN/CREATININE RATIO, URINE:    Protein, urine random 6   Creatinine, urine 18.40   Protein/Creat.  urine Ratio 0.3 [LA]      ED Course User Index  [LA] Cheryl Yadav CNM

## 2021-05-14 NOTE — ANESTHESIA PREPROCEDURE EVALUATION
Relevant Problems   No relevant active problems       Anesthetic History   No history of anesthetic complications            Review of Systems / Medical History  Patient summary reviewed, nursing notes reviewed and pertinent labs reviewed    Pulmonary  Within defined limits                 Neuro/Psych   Within defined limits           Cardiovascular  Within defined limits                     GI/Hepatic/Renal  Within defined limits              Endo/Other      Hypothyroidism       Other Findings              Physical Exam    Airway  Mallampati: II  TM Distance: > 6 cm  Neck ROM: normal range of motion   Mouth opening: Normal     Cardiovascular  Regular rate and rhythm,  S1 and S2 normal,  no murmur, click, rub, or gallop             Dental  No notable dental hx       Pulmonary  Breath sounds clear to auscultation               Abdominal  GI exam deferred       Other Findings            Anesthetic Plan    ASA: 2  Anesthesia type: epidural            Anesthetic plan and risks discussed with: Patient and Spouse

## 2021-05-14 NOTE — H&P
Inpatient History and Physical    Patient: Sabrina Torres MRN: [id number]  SSN: [id number]    YOB: 1985  Age: 37 y.o. Sex: female      Subjective: Akbar Cole is a 36 yo G1 at 37w1d with an RICA of 6/2/21. She presents to labor and delivery from the [facility] for headache and elevated BP. Reports she was home and started to have a headache, fluctuates between 4/10 and 8/10 on pain scale. She took her BP and it was 140/90s. Denies RUQ pain, but has had some nausea. Denies visual disturbances. Denies ctx, lof and vb, endorses good fetal movement.      In [facility] Pre E without SF was diagnosed based on protein/createnine ratio. Admitted to l and d for IOL. Prenatal care has been received at [facility] with Dr. Martin Cash. Pregnancy has been complicated by elevated BPs, GHTN until [facility] visit. LGA fetus 89% at 37 weeks. Pt has a brother with congenital heart defect (VSD), fetal echo was WNL. Pt has hypothyroidism, being followed by endocrine, on 50 mcg levothyroxine daily. Pt also with history of anxiety, stopped meds prior to pregnancy.      Past Medical History:   Diagnosis Date    Abnormal Pap smear     Abnormal Papanicolaou smear of cervix 2012    LEEP    Complication of anesthesia     nausea and vomiting    Psychiatric disorder     ANXIETY/DEPRESSION; no medications    Thyroid disease     hypothyroidism     Past Surgical History:   Procedure Laterality Date    HX HEENT      WISDOM TEETH    HX ORTHOPAEDIC  1998    A/S RIGHT HIP    HX OTHER SURGICAL  2013    Right hip surgery     HX OTHER SURGICAL  1998    Right hip surgery (repaired torn labrum)      Family History   Problem Relation Age of Onset    Cancer Maternal Grandmother         BREAST CA    Cancer Maternal Grandfather         PANCREATIC CA    Heart Disease Paternal Grandfather         CHF    Heart Disease Paternal Uncle         CABG, CAD     Social History     Tobacco Use    Smoking status: Never Smoker    Smokeless tobacco: Never Used Substance Use Topics    Alcohol use: Not Currently     Alcohol/week: 1.7 standard drinks     Types: 2 Glasses of wine per week      Prior to Admission medications    Medication Sig Start Date End Date Taking? Authorizing Provider   PNV QV.68/[id number] fum/folic ac (PRENATAL PO) Take  by mouth. Yes Provider, Historical   cholecalciferol, vitD3,/vit K2 (VITAMIN D3-VITAMIN K2 PO) Take  by mouth. Yes Provider, Historical   zinc 50 mg tab tablet Take  by mouth daily. Yes Provider, Historical   doxylamine succinate (UNISOM) 25 mg tablet Take 10 mg by mouth nightly as needed. Yes Provider, Historical   levothyroxine (SYNTHROID) 75 mcg tablet Take 50 mcg by mouth nightly. Pt states \"not Synthroid, Levothyroxine\"   Yes Provider, Historical   LACTOBACILLUS COMBO NO.6 (PROBIOTIC COMPLEX PO) Take 1 Tab by mouth daily. Yes Provider, Historical   magnesium gluconate 500 mg (27 mg  elemental) tablet Take 250 mg by mouth daily. Provider, Historical        Allergies   Allergen Reactions    Amoxicillin Other (comments)     VOMITING    Erythromycin Rash    Penicillin G Other (comments)     VOMITING    Sulfa (Sulfonamide Antibiotics) Rash          Review of Systems   Constitutional: Negative. HENT: Negative. Eyes: Negative. Respiratory: Negative. Cardiovascular: Negative. Gastrointestinal: Positive for nausea. Endocrine: Negative. Genitourinary: Negative. Musculoskeletal: Negative. Skin: Negative. Allergic/Immunologic: Negative. Neurological: Positive for headaches. Hematological: Negative. Psychiatric/Behavioral: Negative. Objective:     Vitals:    05/13/21 2114 05/13/21 2123   BP: (!) 128/93 (!) 116/57   Pulse: 75 (!) 59   Resp: 15 12   Temp:  98 °F (36.7 °C)   Weight: 76.2 kg (168 lb)    Height: 5' 8\" (1.727 m)         Physical Exam  Vitals signs and nursing note reviewed. Exam conducted with a chaperone present. Constitutional:       Appearance: Normal appearance.  She is normal weight. HENT:      Head: Normocephalic and atraumatic. Nose: Nose normal.      Mouth/Throat:      Mouth: Mucous membranes are moist.      Pharynx: Oropharynx is clear. Eyes:      Extraocular Movements: Extraocular movements intact. Neck:      Musculoskeletal: Normal range of motion and neck supple. Cardiovascular:      Rate and Rhythm: Normal rate and regular rhythm. Pulses: Normal pulses. Heart sounds: Normal heart sounds. Pulmonary:      Effort: Pulmonary effort is normal.      Breath sounds: Normal breath sounds. Abdominal:      Comments: Gravid, ctx mild to palpation, resting tone soft   Genitourinary:     Comments: SVE: Will complete once admitted  Musculoskeletal: Normal range of motion. Skin:     General: Skin is warm and dry. Capillary Refill: Capillary refill takes less than 2 seconds. Neurological:      General: No focal deficit present. Mental Status: She is alert and oriented to person, place, and time. Mental status is at baseline. Psychiatric:         Mood and Affect: Mood normal.         Behavior: Behavior normal.         Thought Content: Thought content normal.         Judgment: Judgment normal.       NST: Monitored for 20 minutes, reactive, cat 1, baseline: 120, positive accels, no decels, moderate variability, ctx irregular, mild to palpation, resting toe soft    Assessment:     Hospital Problems  Never Reviewed          Codes Class Noted POA    Pregnancy ICD-10-CM: Z34.90  ICD-9-CM: V22.2  5/13/2021 Unknown            Pre E without SF  A Positive  Rubella Immune  Hep B and HIV Neg  GBS Neg  COVID Unknown  LGA fetus  Anxiety  Family Hx VSD  Hypothyroidism    Plan:     Admit to L and D  STBB  COVID Screen  Will check cervix, was 3cm in office last week. Will place cook catheter and give cytotec 50 mcg vaginally then follow with pitocin at 0500. Pt agreeable. Discussed new diagnosis in detail, all questions answered.    Maternal and fetal monitoring per protocol  Anticipate vaginal delivery    Signed By: Raúl Mendoza CNM     May 13, 2021

## 2021-05-14 NOTE — PROGRESS NOTES
0288 Bedside shift change report given to Hugo Jacobs RN (oncoming nurse) by Nuria Muro RN (offgoing nurse). Report included the following information SBAR, Intake/Output and MAR.     3259 Patient into knee chest    0945 Dr Maria Isabel Pichardo called and notified patient requesting an epidural. IV fluid bolus begun. 1715 TRANSFER - OUT REPORT:    Verbal report given to [facility] RN(name) on Nichelle Simons  being transferred to [facility](unit) for routine progression of care       Report consisted of patients Situation, Background, Assessment and   Recommendations(SBAR). Information from the following report(s) SBAR, Intake/Output and MAR was reviewed with the receiving nurse. Lines:   Peripheral IV 05/13/21 Left Wrist (Active)        Opportunity for questions and clarification was provided.       Patient transported with:   Dine in

## 2021-05-15 PROCEDURE — 74011250637 HC RX REV CODE- 250/637: Performed by: OBSTETRICS & GYNECOLOGY

## 2021-05-15 PROCEDURE — 74011250637 HC RX REV CODE- 250/637: Performed by: ADVANCED PRACTICE MIDWIFE

## 2021-05-15 PROCEDURE — 65410000002 HC RM PRIVATE OB

## 2021-05-15 RX ORDER — IBUPROFEN 800 MG/1
800 TABLET ORAL
Qty: 30 TAB | Refills: 0 | Status: SHIPPED | OUTPATIENT
Start: 2021-05-15

## 2021-05-15 RX ORDER — SENNOSIDES 8.6 MG/1
1 TABLET ORAL DAILY
Qty: 30 TAB | Refills: 0 | Status: SHIPPED | OUTPATIENT
Start: 2021-05-15

## 2021-05-15 RX ORDER — ONDANSETRON 4 MG/1
4 TABLET, ORALLY DISINTEGRATING ORAL
Status: DISCONTINUED | OUTPATIENT
Start: 2021-05-15 | End: 2021-05-16 | Stop reason: HOSPADM

## 2021-05-15 RX ORDER — ACETAMINOPHEN 325 MG/1
650 TABLET ORAL
Status: DISCONTINUED | OUTPATIENT
Start: 2021-05-15 | End: 2021-05-16 | Stop reason: HOSPADM

## 2021-05-15 RX ORDER — DOCUSATE SODIUM 100 MG/1
100 CAPSULE, LIQUID FILLED ORAL 2 TIMES DAILY
Qty: 60 CAP | Refills: 2 | Status: SHIPPED | OUTPATIENT
Start: 2021-05-15 | End: 2021-08-13

## 2021-05-15 RX ORDER — ONDANSETRON 4 MG/1
8 TABLET, ORALLY DISINTEGRATING ORAL
Status: DISCONTINUED | OUTPATIENT
Start: 2021-05-15 | End: 2021-05-15

## 2021-05-15 RX ADMIN — LEVOTHYROXINE SODIUM 50 MCG: 0.05 TABLET ORAL at 06:54

## 2021-05-15 RX ADMIN — ACETAMINOPHEN 650 MG: 325 TABLET ORAL at 14:26

## 2021-05-15 RX ADMIN — ACETAMINOPHEN 650 MG: 325 TABLET ORAL at 23:54

## 2021-05-15 RX ADMIN — IBUPROFEN 800 MG: 400 TABLET, FILM COATED ORAL at 09:47

## 2021-05-15 RX ADMIN — IBUPROFEN 800 MG: 400 TABLET, FILM COATED ORAL at 00:15

## 2021-05-15 RX ADMIN — SIMETHICONE 80 MG: 80 TABLET, CHEWABLE ORAL at 05:16

## 2021-05-15 RX ADMIN — SIMETHICONE 80 MG: 80 TABLET, CHEWABLE ORAL at 14:28

## 2021-05-15 RX ADMIN — DOCUSATE SODIUM 100 MG: 100 CAPSULE, LIQUID FILLED ORAL at 06:54

## 2021-05-15 RX ADMIN — Medication 8.6 MG: at 05:16

## 2021-05-15 RX ADMIN — DOCUSATE SODIUM 100 MG: 100 CAPSULE, LIQUID FILLED ORAL at 17:59

## 2021-05-15 RX ADMIN — IBUPROFEN 800 MG: 400 TABLET, FILM COATED ORAL at 17:59

## 2021-05-15 NOTE — PROGRESS NOTES
Post-Partum Day Number 1 Progress Note    Nichelle Carmichaelmp     Assessment: Doing well, post partum day 1    Plan:  1. Continue routine postpartum and perineal care as well as maternal education. 2. The risks and benefits of the circumcision  procedure and anesthesia including: bleeding, infection, variability of cosmetic results were discussed at length with the mother. She gives informed consent to proceed as noted and her questions are answered. 3. Laceration - reviewed w patient, bowel regimen, follow up w urogyn reviewed  4. PreE - BPs mild range    Information for the patient's :  Kerri Ramos [[id number]]   Vaginal, Spontaneous    Patient doing well without significant complaint. Voiding without difficulty, normal lochia. Vitals:  Visit Vitals  BP (!) 142/87 (BP 1 Location: Right arm, BP Patient Position: At rest)   Pulse 84   Temp 98 °F (36.7 °C)   Resp 16   Ht 5' 8\" (1.727 m)   Wt 76.2 kg (168 lb)   SpO2 100%   Breastfeeding No   BMI 25.54 kg/m²     Temp (24hrs), Av.3 °F (36.8 °C), Min:97.5 °F (36.4 °C), Max:99.2 °F (37.3 °C)        Exam:   Patient without distress. Abdomen soft, fundus firm, nontender                Lower extremities are symmetric without tenderness, cords or erythema. Labs:     Lab Results   Component Value Date/Time    WBC 8.7 2021 12:32 PM    WBC 9.7 2021 03:35 PM    WBC 6.8 2020 08:24 AM    HGB 12.9 2021 12:32 PM    HGB 11.8 2021 03:35 PM    HGB 12.3 2020 08:24 AM    HCT 37.6 2021 12:32 PM    HCT 33.6 (L) 2021 03:35 PM    HCT 36.3 2020 08:24 AM    PLATELET 728 (L)  12:32 PM    PLATELET 019  03:35 PM    PLATELET 499  08:24 AM       No results found for this or any previous visit (from the past 24 hour(s)).

## 2021-05-15 NOTE — DISCHARGE SUMMARY
Obstetrical Discharge Summary     Name: Andria Louie MRN: [id number]  SSN: [id number]    YOB: 1985  Age: 37 y.o. Sex: female      Admit Date: 2021    Discharge Date: 21    Admitting Physician: Raúl Mendoza CNM     Attending Physician:  Chinmay Edmonds MD     Admission Diagnoses: Pregnancy [Z34.90]    Discharge Diagnoses:   Information for the patient's :  Venkata Urias [[id number]]   Delivery of a 3.08 kg male infant via Vaginal, Spontaneous on 2021 at 12:49 PM  by  . Apgars were 7  and 9 . Additional Diagnoses:   Hospital Problems  Date Reviewed: 2021          Codes Class Noted POA    Pregnancy ICD-10-CM: Z34.90  ICD-9-CM: V22.2  2021 Unknown             Lab Results   Component Value Date/Time    Rubella, External immune 10/26/2020    GrBStrep, External negative 2021       Hospital Course: Normal hospital course following the delivery. 5. preE - BPs mild/normal range  6. Anxiety - patient has rx of zoloft at home, she may restart it  - we reviewed normal vs abnormal mood changes- denies si/hi  - will follow up in one week for mood and BP    Disposition at Discharge: Home or self care    Discharged Condition: Stable    Patient Instructions:   Current Discharge Medication List      START taking these medications    Details   docusate sodium (COLACE) 100 mg capsule Take 1 Cap by mouth two (2) times a day for 90 days. Qty: 60 Cap, Refills: 2      ibuprofen (MOTRIN) 800 mg tablet Take 1 Tab by mouth every eight (8) hours as needed for Pain. Qty: 30 Tab, Refills: 0      senna (SENOKOT) 8.6 mg tablet Take 1 Tab by mouth daily. Qty: 30 Tab, Refills: 0         CONTINUE these medications which have NOT CHANGED    Details   PNV VC.68/PWFUFRA fum/folic ac (PRENATAL PO) Take  by mouth. zinc 50 mg tab tablet Take  by mouth daily. levothyroxine (SYNTHROID) 75 mcg tablet Take 50 mcg by mouth nightly.  Pt states \"not Synthroid, Levothyroxine\" LACTOBACILLUS COMBO NO.6 (PROBIOTIC COMPLEX PO) Take 1 Tab by mouth daily. magnesium gluconate 500 mg (27 mg  elemental) tablet Take 250 mg by mouth daily. STOP taking these medications       cholecalciferol, vitD3,/vit K2 (VITAMIN D3-VITAMIN K2 PO) Comments:   Reason for Stopping:         doxylamine succinate (UNISOM) 25 mg tablet Comments:   Reason for Stopping:               Reference my discharge instructions. Follow-up Appointments   Procedures    FOLLOW UP VISIT Appointment in: 6 Weeks Routine - urogyn appt to be scheduled by our office     Routine  - urogyn appt to be scheduled by our office     Standing Status:   Standing     Number of Occurrences:   1     Order Specific Question:   Appointment in     Answer:   6 Weeks        Signed By:  Neymar Dewitt MD     May 15, 2021                      .

## 2021-05-15 NOTE — LACTATION NOTE
This note was copied from a baby's chart. Initial Lactation Consultation - Baby born vaginally yesterday evening to a  mom at 40 3/7 weeks gestation. Mom state baby has been latching and nursing well. I helped mom with a feeding this afternoon. We reviewed positioning the baby at the breast and how to help him get a deep latch. Baby latched well and was sucking rhythmically with swallows noted. Feeding Plan: Mother will keep baby skin to skin as often as possible, feed on demand, respond to feeding cues, obtain latch, listen for audible swallowing, be aware of signs of oxytocin release/ cramping,thrist,sleepyness while breastfeeding. Mom will not limit the time the baby is at the breast. She will allow the baby to completely finish one breast and then offer the second breast at each feeding.

## 2021-05-15 NOTE — ROUTINE PROCESS
Bedside and Verbal shift change report given to VENUS Tomas (oncoming nurse) by Mila Briggs RN (offgoing nurse). Report included the following information SBAR.

## 2021-05-15 NOTE — ROUTINE PROCESS
Bedside and Verbal shift change report given to MARY BETH Chandler RN (oncoming nurse) by Ana Bess. Sav Antonio RN (offgoing nurse). Report included the following information SBAR.

## 2021-05-15 NOTE — PROGRESS NOTES
Bedside and Verbal shift change report given to [address] [place] [address] (oncoming nurse) by JULI Snider RN (offgoing nurse). Report included the following information SBAR.

## 2021-05-16 VITALS
SYSTOLIC BLOOD PRESSURE: 153 MMHG | TEMPERATURE: 98.2 F | HEIGHT: 68 IN | BODY MASS INDEX: 25.46 KG/M2 | RESPIRATION RATE: 16 BRPM | OXYGEN SATURATION: 100 % | DIASTOLIC BLOOD PRESSURE: 78 MMHG | HEART RATE: 80 BPM | WEIGHT: 168 LBS

## 2021-05-16 PROCEDURE — 74011250637 HC RX REV CODE- 250/637: Performed by: ADVANCED PRACTICE MIDWIFE

## 2021-05-16 PROCEDURE — 74011250637 HC RX REV CODE- 250/637: Performed by: OBSTETRICS & GYNECOLOGY

## 2021-05-16 RX ADMIN — LEVOTHYROXINE SODIUM 50 MCG: 0.05 TABLET ORAL at 07:57

## 2021-05-16 RX ADMIN — ACETAMINOPHEN 650 MG: 325 TABLET ORAL at 12:05

## 2021-05-16 RX ADMIN — SIMETHICONE 80 MG: 80 TABLET, CHEWABLE ORAL at 12:05

## 2021-05-16 RX ADMIN — DOCUSATE SODIUM 100 MG: 100 CAPSULE, LIQUID FILLED ORAL at 07:57

## 2021-05-16 RX ADMIN — IBUPROFEN 800 MG: 400 TABLET, FILM COATED ORAL at 04:00

## 2021-05-16 RX ADMIN — IBUPROFEN 800 MG: 400 TABLET, FILM COATED ORAL at 12:05

## 2021-05-16 RX ADMIN — ACETAMINOPHEN 650 MG: 325 TABLET ORAL at 07:57

## 2021-05-16 NOTE — ROUTINE PROCESS
Bedside and Verbal shift change report given to VENUS Aguirre RN (oncoming nurse) by Ramin Rivas. Aditya Ferro RN (offgoing nurse). Report included the following information SBAR.

## 2021-05-16 NOTE — PROGRESS NOTES
Post-Partum Day Number 2 Progress Note    Nichelle KAIN Pool     Assessment: Doing well, post partum day 2    Plan:   1. Discharge home today  2. Follow up in office in 6 weeks with Sandy Mendoza MD  3. Post partum activity advised, diet as tolerated  4. Discharge Medications: pain medicines as taken in hospital, and medications prior to admission    5. preE - BPs mild/normal range  6. Anxiety - patient has rx of zoloft at home, she may restart it  - we reviewed normal vs abnormal mood changes- denies si/hi  - will follow up in one week for mood and BP    Information for the patient's :  Yusef Grissom [[id number]]   Vaginal, Spontaneous    Patient doing well without significant complaint. Voiding without difficulty, normal lochia. Vitals:  Visit Vitals  /76 (BP 1 Location: Right upper arm, BP Patient Position: At rest)   Pulse 84   Temp 98.3 °F (36.8 °C)   Resp 16   Ht 5' 8\" (1.727 m)   Wt 76.2 kg (168 lb)   SpO2 100%   Breastfeeding No   BMI 25.54 kg/m²     Temp (24hrs), Av °F (36.7 °C), Min:97.7 °F (36.5 °C), Max:98.3 °F (36.8 °C)      Exam:         Patient without distress. Abdomen soft, fundus firm, nontender                 Lower extremities are symmetric without tenderness, cords or erythema. Labs:     Lab Results   Component Value Date/Time    WBC 8.7 2021 12:32 PM    WBC 9.7 2021 03:35 PM    WBC 6.8 2020 08:24 AM    HGB 12.9 2021 12:32 PM    HGB 11.8 2021 03:35 PM    HGB 12.3 2020 08:24 AM    HCT 37.6 2021 12:32 PM    HCT 33.6 (L) 2021 03:35 PM    HCT 36.3 2020 08:24 AM    PLATELET 346 (L)  12:32 PM    PLATELET 795  03:35 PM    PLATELET 109  08:24 AM       No results found for this or any previous visit (from the past 24 hour(s)).

## 2021-05-16 NOTE — LACTATION NOTE
This note was copied from a baby's chart. Mom and baby scheduled for discharge today. Baby nursing well and has improved throughout post partum stay, deep latch maintained, mother is comfortable, milk is in transition, baby feeding vigorously with rhythmic suck, swallow, breathe pattern, with audible swallowing, and evident milk transfer, both breasts offerd, baby is asleep following feeding. Baby is feeding on demand. We reviewed cluster feeding and breast engorgement. I helped mom with a feeding. We reviewed positioning the baby at the breast and then how to help the baby get a deep latch. Mom is gaining confidence on getting baby latched on her own. Breast feeding teaching completed and all questions answered.

## 2021-05-16 NOTE — OP NOTES
Late entry    OPERATIVE NOTE        DATE OF PROCEDURE:  5/14/2021    PREOPERATIVE DIAGNOSIS:  Extensive obstetrical laceration  POSTOPERATIVE DIAGNOSIS:  2nd degree laceration w button hole of perineum, bilateral sidewall lacerations, and anterior laceration    PROCEDURE:  Exam under anesthesia with extensive vaginal repair      SURGEON:  Veronica Severino MD    ASSISTANT:  none    ANESTHESIA: spinal and local anesthesia    EBL:  50mL in OR    FINDINGS: 2nd degree laceration w button hole of perineum, bilateral sidewall lacerations, and anterior laceration      IMPLANTS: none    Specimen:  none    PROCEDURE: Patient was placed on the operating table in the lithotomy position. Time out was done. Exam in the room revealed an extensive laceration, and it was unclear whether it was a complete third degree laceration, so patient was taken to OR for better positioning and lighting. Exam in OR revealed that rectal mucosa was completely intact, and in fact the rectal sphincter was also intact. However the vaginal mucosa was torn anterior and posterior to the sphincter, and a finger placed in the vagina could exit near the hole in her perineal skin near anus, made during pushing.     With finger placed in rectum, the vaginal tear over the rectal mucosa, was repaired with 4-0 vicryl. The tissue was then reapproximated with 3-0 vicryl, to attach sphincter to vaginal tissue and perineal tissue. Attention was then turned to bilateral side wall lacerations that were repaired w 3-0 vicryl in standard fashion, the remaining second degree from episiotomy was also repaired in standard fashion.      An anterior laceration between urethra (but not involving urethra) and clitoris was reapproximated for hemostasis w monocril. Monocril also then used to close the skin near the anus.  A final rectal exam was performed revealing no suture in rectum, and a well developed perineal body, sphincter, and rectovaginal septum.     Patient tolerated well, received Ancef 2g in OR

## 2021-05-16 NOTE — DISCHARGE INSTRUCTIONS
POSTPARTUM DISCHARGE INSTRUCTIONS       Name:  Tiffany Tate  YOB: 1985  Admission Diagnosis:  Pregnancy [Z34.90]     Discharge Diagnosis:    Problem List as of 2021 Date Reviewed: 2021          Codes Class Noted - Resolved    Pregnancy ICD-10-CM: Z34.90  ICD-9-CM: V22.2  2021 - Present        Elevated blood pressure affecting pregnancy in third trimester, antepartum ICD-10-CM: O16.3  ICD-9-CM: 642.33  2021 - Present            Attending Physician:  Alejandra Grace MD    Delivery Type:  {Postpartum Delivery Types:04862}     Additional Information:  {Postpartum Pregnancy Complications:92366}    These are general instructions for a healthy lifestyle:    No smoking/ No tobacco products/ Avoid exposure to second hand smoke    Surgeon General's Warning:  Quitting smoking now greatly reduces serious risk to your health. Obesity, smoking, and sedentary lifestyle greatly increases your risk for illness    A healthy diet, regular physical exercise & weight monitoring are important for maintaining a healthy lifestyle    Recognize signs and symptoms of STROKE:    F-face looks uneven    A-arms unable to move or move unevenly    S-speech slurred or non-existent    T-time-call 911 as soon as signs and symptoms begin - DO NOT go       back to bed or wait to see if you get better - TIME IS BRAIN. I have had the opportunity to make my options or choices for discharge. I have received and understand these instructions. Patient Education        After Your Delivery (the Postpartum Period): Care Instructions  Your Care Instructions     Congratulations on the birth of your baby. Like pregnancy, the  period can be a time of excitement, samantha, and exhaustion. You may look at your wondrous little baby and feel happy. You may also be overwhelmed by your new sleep hours and new responsibilities. At first, babies often sleep during the days and are awake at night.  They do not have a pattern or routine. They may make sudden gasps, jerk themselves awake, or look like they have crossed eyes. These are all normal, and they may even make you smile. In these first weeks after delivery, try to take good care of yourself. It may take 4 to 6 weeks to feel like yourself again, and possibly longer if you had a  birth. You will likely feel very tired for several weeks. Your days will be full of ups and downs, but lots of samantha as well. Follow-up care is a key part of your treatment and safety. Be sure to make and go to all appointments, and call your doctor if you are having problems. It's also a good idea to know your test results and keep a list of the medicines you take. How can you care for yourself at home? Take care of your body after delivery  · Use pads instead of tampons for the bloody flow that may last as long as 2 weeks. · Ease cramps with ibuprofen (Advil, Motrin). · Ease soreness of hemorrhoids and the area between your vagina and rectum with ice compresses or witch hazel pads. · Ease constipation by drinking lots of fluid and eating high-fiber foods. Ask your doctor about over-the-counter stool softeners. · Cleanse yourself with a gentle squeeze of warm water from a bottle instead of wiping with toilet paper. · Take a sitz bath in warm water several times a day. · Wear a good nursing bra. Ease sore and swollen breasts with warm, wet washcloths. · If you are not breastfeeding, use ice rather than heat for breast soreness. · Your period may not start for several months if you are breastfeeding. You may bleed more, and longer at first, than you did before you got pregnant. · Wait until you are healed (about 4 to 6 weeks) before you have sexual intercourse. Your doctor will tell you when it is okay to have sex. · Try not to travel with your baby for 5 or 6 weeks. If you take a long car trip, make frequent stops to walk around and stretch. Avoid exhaustion  · Rest every day.  Try to nap when your baby naps. · Ask another adult to be with you for a few days after delivery. · Plan for  if you have other children. · Stay flexible so you can eat at odd hours and sleep when you need to. Both you and your baby are making new schedules. · Plan small trips to get out of the house. Change can make you feel less tired. · Ask for help with housework, cooking, and shopping. Remind yourself that your job is to care for your baby. Know about help for postpartum depression  · \"Baby blues\" are common for the first 1 to 2 weeks after birth. You may cry or feel sad or irritable for no reason. · Rest whenever you can. Being tired makes it harder to handle your emotions. · Go for walks with your baby. · Talk to your partner, friends, and family about your feelings. · If your symptoms last for more than a few weeks, or if you feel very depressed, ask your doctor for help. · Postpartum depression can be treated. Support groups and counseling can help. Sometimes medicine can also help. Stay healthy  · Eat healthy foods so you have more energy and lose extra baby pounds. · If you breastfeed, avoid drugs. If you quit smoking during pregnancy, try to stay smoke-free. If you choose to have a drink now and then, have only one drink, and limit the number of occasions that you have a drink. Wait to breastfeed at least 2 hours after you have a drink to reduce the amount of alcohol the baby may get in the milk. · Start daily exercise after 4 to 6 weeks, but rest when you feel tired. · Learn exercises to tone your belly. Do Kegel exercises to regain strength in your pelvic muscles. You can do these exercises while you stand or sit. ? Squeeze the same muscles you would use to stop your urine. Your belly and thighs should not move. ? Hold the squeeze for 3 seconds, and then relax for 3 seconds. ? Start with 3 seconds. Then add 1 second each week until you are able to squeeze for 10 seconds.   ? Repeat the exercise 10 to 15 times for each session. Do three or more sessions each day. · Find a class for new mothers and new babies that has an exercise time. · If you had a  birth, give yourself a bit more time before you exercise, and be careful. When should you call for help? Call  911 anytime you think you may need emergency care. For example, call if:    · You have thoughts of harming yourself, your baby, or another person.     · You passed out (lost consciousness).     · You have chest pain, are short of breath, or cough up blood.     · You have a seizure. Call your doctor now or seek immediate medical care if:    · You have severe vaginal bleeding. This means you are passing blood clots and soaking through a pad each hour for 2 or more hours.     · You are dizzy or lightheaded, or you feel like you may faint.     · You have a fever.     · You have new or more belly pain.     · You have signs of a blood clot in your leg (called a deep vein thrombosis), such as:  ? Pain in the calf, back of the knee, thigh, or groin. ? Redness and swelling in your leg or groin.     · You have signs of preeclampsia, such as:  ? Sudden swelling of your face, hands, or feet. ? New vision problems (such as dimness, blurring, or seeing spots). ? A severe headache. Watch closely for changes in your health, and be sure to contact your doctor if:    · Your vaginal bleeding seems to be getting heavier.     · You have new or worse vaginal discharge.     · You feel sad, anxious, or hopeless for more than a few days.     · You do not get better as expected. Where can you learn more? Go to http://www.[employer].com/  Enter A461 in the search box to learn more about \"After Your Delivery (the Postpartum Period): Care Instructions. \"  Current as of: 2020               Content Version: 12.8  © 9406-1970 [employer].    Care instructions adapted under license by [employer] (which disclaims liability or warranty for this information). If you have questions about a medical condition or this instruction, always ask your healthcare professional. [address] any warranty or liability for your use of this information.

## 2021-07-17 LAB
ALBUMIN SERPL-MCNC: 4.1 G/DL (ref 3.5–5)
ALBUMIN/GLOB SERPL: 1.1 {RATIO} (ref 1.1–2.2)
ALP SERPL-CCNC: 71 U/L (ref 45–117)
ALT SERPL-CCNC: 60 U/L (ref 12–78)
ANION GAP SERPL CALC-SCNC: 4 MMOL/L (ref 5–15)
AST SERPL-CCNC: 27 U/L (ref 15–37)
BASOPHILS # BLD: 0.1 K/UL (ref 0–0.1)
BASOPHILS NFR BLD: 1 % (ref 0–1)
BILIRUB SERPL-MCNC: 0.4 MG/DL (ref 0.2–1)
BNP SERPL-MCNC: 38 PG/ML
BUN SERPL-MCNC: 18 MG/DL (ref 6–20)
BUN/CREAT SERPL: 22 (ref 12–20)
CALCIUM SERPL-MCNC: 9.4 MG/DL (ref 8.5–10.1)
CHLORIDE SERPL-SCNC: 107 MMOL/L (ref 97–108)
CO2 SERPL-SCNC: 29 MMOL/L (ref 21–32)
CREAT SERPL-MCNC: 0.81 MG/DL (ref 0.55–1.02)
D DIMER PPP FEU-MCNC: 0.43 MG/L FEU (ref 0–0.65)
DIFFERENTIAL METHOD BLD: ABNORMAL
EOSINOPHIL # BLD: 0.2 K/UL (ref 0–0.4)
EOSINOPHIL NFR BLD: 2 % (ref 0–7)
ERYTHROCYTE [DISTWIDTH] IN BLOOD BY AUTOMATED COUNT: 11.3 % (ref 11.5–14.5)
GLOBULIN SER CALC-MCNC: 3.9 G/DL (ref 2–4)
GLUCOSE SERPL-MCNC: 87 MG/DL (ref 65–100)
HCT VFR BLD AUTO: 41.8 % (ref 35–47)
HGB BLD-MCNC: 14.1 G/DL (ref 11.5–16)
IMM GRANULOCYTES # BLD AUTO: 0 K/UL (ref 0–0.04)
IMM GRANULOCYTES NFR BLD AUTO: 0 % (ref 0–0.5)
LYMPHOCYTES # BLD: 2.5 K/UL (ref 0.8–3.5)
LYMPHOCYTES NFR BLD: 40 % (ref 12–49)
MAGNESIUM SERPL-MCNC: 2.3 MG/DL (ref 1.6–2.4)
MCH RBC QN AUTO: 31.8 PG (ref 26–34)
MCHC RBC AUTO-ENTMCNC: 33.7 G/DL (ref 30–36.5)
MCV RBC AUTO: 94.1 FL (ref 80–99)
MONOCYTES # BLD: 0.6 K/UL (ref 0–1)
MONOCYTES NFR BLD: 10 % (ref 5–13)
NEUTS SEG # BLD: 2.9 K/UL (ref 1.8–8)
NEUTS SEG NFR BLD: 47 % (ref 32–75)
NRBC # BLD: 0 K/UL (ref 0–0.01)
NRBC BLD-RTO: 0 PER 100 WBC
PLATELET # BLD AUTO: 201 K/UL (ref 150–400)
PMV BLD AUTO: 9.8 FL (ref 8.9–12.9)
POTASSIUM SERPL-SCNC: 3.9 MMOL/L (ref 3.5–5.1)
PROT SERPL-MCNC: 8 G/DL (ref 6.4–8.2)
RBC # BLD AUTO: 4.44 M/UL (ref 3.8–5.2)
SODIUM SERPL-SCNC: 140 MMOL/L (ref 136–145)
TROPONIN I SERPL-MCNC: <0.05 NG/ML
TSH SERPL DL<=0.05 MIU/L-ACNC: 2.09 UIU/ML (ref 0.36–3.74)
WBC # BLD AUTO: 6.1 K/UL (ref 3.6–11)

## 2021-07-17 PROCEDURE — 85379 FIBRIN DEGRADATION QUANT: CPT

## 2021-07-17 PROCEDURE — 85025 COMPLETE CBC W/AUTO DIFF WBC: CPT

## 2021-07-17 PROCEDURE — 80053 COMPREHEN METABOLIC PANEL: CPT

## 2021-07-17 PROCEDURE — 99284 EMERGENCY DEPT VISIT MOD MDM: CPT

## 2021-07-17 PROCEDURE — 93005 ELECTROCARDIOGRAM TRACING: CPT

## 2021-07-17 PROCEDURE — 83880 ASSAY OF NATRIURETIC PEPTIDE: CPT

## 2021-07-17 PROCEDURE — 36415 COLL VENOUS BLD VENIPUNCTURE: CPT

## 2021-07-17 PROCEDURE — 84484 ASSAY OF TROPONIN QUANT: CPT

## 2021-07-17 PROCEDURE — 84443 ASSAY THYROID STIM HORMONE: CPT

## 2021-07-17 PROCEDURE — 83735 ASSAY OF MAGNESIUM: CPT

## 2021-07-18 ENCOUNTER — APPOINTMENT (OUTPATIENT)
Dept: GENERAL RADIOLOGY | Age: 36
End: 2021-07-18
Attending: NURSE PRACTITIONER
Payer: COMMERCIAL

## 2021-07-18 ENCOUNTER — HOSPITAL ENCOUNTER (EMERGENCY)
Age: 36
Discharge: HOME | End: 2021-07-18
Attending: EMERGENCY MEDICINE
Payer: COMMERCIAL

## 2021-07-18 VITALS
HEART RATE: 60 BPM | RESPIRATION RATE: 18 BRPM | SYSTOLIC BLOOD PRESSURE: 134 MMHG | OXYGEN SATURATION: 100 % | TEMPERATURE: 97.8 F | DIASTOLIC BLOOD PRESSURE: 83 MMHG

## 2021-07-18 DIAGNOSIS — R42 DIZZINESS: ICD-10-CM

## 2021-07-18 DIAGNOSIS — R00.2 PALPITATIONS: Primary | ICD-10-CM

## 2021-07-18 LAB
ATRIAL RATE: 55 BPM
CALCULATED P AXIS, ECG09: 57 DEGREES
CALCULATED R AXIS, ECG10: 55 DEGREES
CALCULATED T AXIS, ECG11: 56 DEGREES
DIAGNOSIS, 93000: NORMAL
P-R INTERVAL, ECG05: 134 MS
Q-T INTERVAL, ECG07: 418 MS
QRS DURATION, ECG06: 92 MS
QTC CALCULATION (BEZET), ECG08: 399 MS
VENTRICULAR RATE, ECG03: 55 BPM

## 2021-07-18 PROCEDURE — 71046 X-RAY EXAM CHEST 2 VIEWS: CPT

## 2021-07-18 NOTE — ED TRIAGE NOTES
Triage: Pt arrives from home with CC of chest pain and what she describes as blood pooling in her legs. She is 9 weeks post partum and was induced early for pre eclampsia. She denies floaters in her vision, edema, or headache. The chest pain is worsened by laying supine. She reports her fitbit has alarmed her that her heart rate is in the 50s.

## 2021-07-18 NOTE — ED NOTES
Discharge instructions given to patient by MD and nurse. IV d/c. Pt ambulated off unit in no signs of distress.

## 2021-07-18 NOTE — ED PROVIDER NOTES
HPI     Pj Bowers is a 37 y.o. female with Hx of anxiety, depression, hypothyroidism who presents ambulatory w/ mother to [facility] ED with cc of chest heaviness, SOB, dizziness, bradycardia. Pt states that she is approx 9w post partum. Reports that since delivery she has experienced intermittent chest heaviness associated w/ SOB, dizziness, and bradycardia (noted HR in high 40s on fitbit). She also has a sensation of \"blood pooling\" in her legs with possible skin color changes. She denies chest pain, states it is \"more heaviness\" when she lies flat. States prior HR were in 60s prepregnancy. Has followed up with OBGYN post partum w/o any acute or emergent findings. States currently pumping breast milk. Denies any recent F/C, N/V/D, cough, congestion, CP, SOB, or urinary concerns. PCP: Adenike Espino MD    There are no other complaints, changes or physical findings at this time.         Past Medical History:   Diagnosis Date    Abnormal Pap smear     Abnormal Papanicolaou smear of cervix 2012    LEEP    Complication of anesthesia     nausea and vomiting    Psychiatric disorder     ANXIETY/DEPRESSION; no medications    Thyroid disease     hypothyroidism       Past Surgical History:   Procedure Laterality Date    HX HEENT      WISDOM TEETH    HX ORTHOPAEDIC  1998    A/S RIGHT HIP    HX OTHER SURGICAL  2013    Right hip surgery     HX OTHER SURGICAL  1998    Right hip surgery (repaired torn labrum)         Family History:   Problem Relation Age of Onset    Cancer Maternal Grandmother         BREAST CA    Cancer Maternal Grandfather         PANCREATIC CA    Heart Disease Paternal Grandfather         CHF    Heart Disease Paternal Uncle         CABG, CAD       Social History     Socioeconomic History    Marital status:      Spouse name: Not on file    Number of children: Not on file    Years of education: Not on file    Highest education level: Not on file   Occupational History  Not on file   Tobacco Use    Smoking status: Never Smoker    Smokeless tobacco: Never Used   Vaping Use    Vaping Use: Never used   Substance and Sexual Activity    Alcohol use: Not Currently     Alcohol/week: 1.7 standard drinks     Types: 2 Glasses of wine per week    Drug use: Not Currently    Sexual activity: Not on file   Other Topics Concern     Service Not Asked    Blood Transfusions Not Asked    Caffeine Concern Not Asked    Occupational Exposure Not Asked    Hobby Hazards Not Asked    Sleep Concern Not Asked    Stress Concern Not Asked    Weight Concern Not Asked    Special Diet Not Asked    Back Care Not Asked    Exercise Not Asked    Bike Helmet Not Asked   [address] Not Asked    Self-Exams Not Asked   Social History Narrative    Not on file     Social Determinants of Health     Financial Resource Strain:     Difficulty of Paying Living Expenses:    Food Insecurity:     Worried About Running Out of Food in the Last Year:     Ran Out of Food in the Last Year:    Transportation Needs:     Lack of Transportation (Medical):  Lack of Transportation (Non-Medical):    Physical Activity:     Days of Exercise per Week:     Minutes of Exercise per Session:    Stress:     Feeling of Stress :    Social Connections:     Frequency of Communication with Friends and Family:     Frequency of Social Gatherings with Friends and Family:     Attends Rastafarian Services:     Active Member of Clubs or Organizations:     Attends Club or Organization Meetings:     Marital Status:    Intimate Partner Violence:     Fear of Current or Ex-Partner:     Emotionally Abused:     Physically Abused:     Sexually Abused: ALLERGIES: Amoxicillin, Erythromycin, Penicillin g, and Sulfa (sulfonamide antibiotics)    Review of Systems   Constitutional: Negative for activity change, appetite change, chills and fever. HENT: Negative for congestion, rhinorrhea and sore throat. Respiratory: Positive for shortness of breath. Negative for cough. Cardiovascular: Positive for palpitations. Negative for chest pain. Gastrointestinal: Negative for abdominal pain, diarrhea, nausea and vomiting. Genitourinary: Negative for dysuria, flank pain, frequency and urgency. Musculoskeletal: Negative for arthralgias, back pain, myalgias and neck pain. Skin: Negative for color change and rash. Neurological: Positive for dizziness. Negative for speech difficulty, weakness, light-headedness, numbness and headaches. Psychiatric/Behavioral: Negative for agitation, behavioral problems and confusion. All other systems reviewed and are negative. Vitals:    07/17/21 2247   BP: (!) 151/94   Pulse: 63   Resp: 16   Temp: 97.3 °F (36.3 °C)   SpO2: 100%            Physical Exam  Vitals and nursing note reviewed. Constitutional:       General: She is not in acute distress. Appearance: She is well-developed. HENT:      Head: Normocephalic and atraumatic. Right Ear: External ear normal.      Left Ear: External ear normal.      Nose: Nose normal.      Mouth/Throat:      Pharynx: No oropharyngeal exudate. Eyes:      Conjunctiva/sclera: Conjunctivae normal.      Pupils: Pupils are equal, round, and reactive to light. Cardiovascular:      Rate and Rhythm: Normal rate and regular rhythm. Heart sounds: Normal heart sounds. Pulmonary:      Effort: Pulmonary effort is normal.      Breath sounds: Normal breath sounds. Abdominal:      Palpations: Abdomen is soft. Musculoskeletal:         General: Normal range of motion. Cervical back: Normal range of motion and neck supple. Skin:     General: Skin is warm and dry. Neurological:      Mental Status: She is alert and oriented to person, place, and time. Psychiatric:         Behavior: Behavior normal.         Thought Content:  Thought content normal.         Judgment: Judgment normal.          MDM  Number of Diagnoses or Management Options  Dizziness  Palpitations  Diagnosis management comments: Ddx: anxiety, stress, arrthymia, post partum cardiomyopathy, PE     EKG reviewed and no emergent findings   Trop/ D Dimer/ proBNP WNL   CXR w/o acute findings   TSH WNL   Advised to f/u w/ OBGYN and cards ASAP   Reasons to return to ED provided        Amount and/or Complexity of Data Reviewed  Clinical lab tests: ordered and reviewed  Tests in the radiology section of CPT®: ordered and reviewed  Review and summarize past medical records: yes           Procedures      LABORATORY TESTS:  Recent Results (from the past 12 hour(s))   EKG, 12 LEAD, INITIAL    Collection Time: 07/17/21 10:59 PM   Result Value Ref Range    Ventricular Rate 55 BPM    Atrial Rate 55 BPM    P-R Interval 134 ms    QRS Duration 92 ms    Q-T Interval 418 ms    QTC Calculation (Bezet) 399 ms    Calculated P Axis 57 degrees    Calculated R Axis 55 degrees    Calculated T Axis 56 degrees    Diagnosis Sinus bradycardia  No previous ECGs available      CBC WITH AUTOMATED DIFF    Collection Time: 07/17/21 11:06 PM   Result Value Ref Range    WBC 6.1 3.6 - 11.0 K/uL    RBC 4.44 3.80 - 5.20 M/uL    HGB 14.1 11.5 - 16.0 g/dL    HCT 41.8 35.0 - 47.0 %    MCV 94.1 80.0 - 99.0 FL    MCH 31.8 26.0 - 34.0 PG    MCHC 33.7 30.0 - 36.5 g/dL    RDW 11.3 (L) 11.5 - 14.5 %    PLATELET 228 460 - 873 K/uL    MPV 9.8 8.9 - 12.9 FL    NRBC 0.0 0  WBC    ABSOLUTE NRBC 0.00 0.00 - 0.01 K/uL    NEUTROPHILS 47 32 - 75 %    LYMPHOCYTES 40 12 - 49 %    MONOCYTES 10 5 - 13 %    EOSINOPHILS 2 0 - 7 %    BASOPHILS 1 0 - 1 %    IMMATURE GRANULOCYTES 0 0.0 - 0.5 %    ABS. NEUTROPHILS 2.9 1.8 - 8.0 K/UL    ABS. LYMPHOCYTES 2.5 0.8 - 3.5 K/UL    ABS. MONOCYTES 0.6 0.0 - 1.0 K/UL    ABS. EOSINOPHILS 0.2 0.0 - 0.4 K/UL    ABS. BASOPHILS 0.1 0.0 - 0.1 K/UL    ABS. IMM.  GRANS. 0.0 0.00 - 0.04 K/UL    DF AUTOMATED     METABOLIC PANEL, COMPREHENSIVE    Collection Time: 07/17/21 11:06 PM   Result Value Ref Range    Sodium 140 136 - 145 mmol/L    Potassium 3.9 3.5 - 5.1 mmol/L    Chloride 107 97 - 108 mmol/L    CO2 29 21 - 32 mmol/L    Anion gap 4 (L) 5 - 15 mmol/L    Glucose 87 65 - 100 mg/dL    BUN 18 6 - 20 MG/DL    Creatinine 0.81 0.55 - 1.02 MG/DL    BUN/Creatinine ratio 22 (H) 12 - 20      GFR est AA >60 >60 ml/min/1.73m2    GFR est non-AA >60 >60 ml/min/1.73m2    Calcium 9.4 8.5 - 10.1 MG/DL    Bilirubin, total 0.4 0.2 - 1.0 MG/DL    ALT (SGPT) 60 12 - 78 U/L    AST (SGOT) 27 15 - 37 U/L    Alk. phosphatase 71 45 - 117 U/L    Protein, total 8.0 6.4 - 8.2 g/dL    Albumin 4.1 3.5 - 5.0 g/dL    Globulin 3.9 2.0 - 4.0 g/dL    A-G Ratio 1.1 1.1 - 2.2     NT-PRO BNP    Collection Time: 07/17/21 11:06 PM   Result Value Ref Range    NT pro-BNP 38 <125 PG/ML   D DIMER    Collection Time: 07/17/21 11:06 PM   Result Value Ref Range    D-dimer 0.43 0.00 - 0.65 mg/L FEU   TSH 3RD GENERATION    Collection Time: 07/17/21 11:06 PM   Result Value Ref Range    TSH 2.09 0.36 - 3.74 uIU/mL   MAGNESIUM    Collection Time: 07/17/21 11:06 PM   Result Value Ref Range    Magnesium 2.3 1.6 - 2.4 mg/dL   TROPONIN I    Collection Time: 07/17/21 11:06 PM   Result Value Ref Range    Troponin-I, Qt. <0.05 <0.05 ng/mL       IMAGING RESULTS:  XR CHEST PA LAT   Final Result      No acute process. MEDICATIONS GIVEN:  Medications - No data to display    IMPRESSION:  1. Palpitations    2. Dizziness        PLAN:  1. Discharge Medication List as of 7/18/2021 12:57 AM        2.    Follow-up Information     Follow up With Specialties Details Why Contact Info    [address] DEP Emergency Medicine Go to  As needed, If symptoms worsen [address]  [phone]    Carlos Manuel Randall MD Obstetrics & Gynecology, Gynecology, Obstetrics Schedule an appointment as soon as possible for a visit   [address] [place] [id number]      Abhinav Navarro MD Cardiology Schedule an appointment as soon as possible for a visit  for cardiology follow up [address]  [phone]          3. Return to ED if worse     Discharge Note:    The patient is ready for discharge. The patient's signs, symptoms, diagnosis, and discharge instruction have been discussed and the patient has conveyed their understanding. The patient is to follow up as recommended or return to the ER should their symptoms worsen. Plan has been discussed and the patient is in agreement.     Joni Borrego NP

## 2022-03-18 PROBLEM — O16.3 ELEVATED BLOOD PRESSURE AFFECTING PREGNANCY IN THIRD TRIMESTER, ANTEPARTUM: Status: ACTIVE | Noted: 2021-05-12

## 2022-03-19 PROBLEM — Z34.90 PREGNANCY: Status: ACTIVE | Noted: 2021-05-13

## 2022-05-26 ENCOUNTER — TRANSCRIBE ORDER (OUTPATIENT)
Dept: SCHEDULING | Age: 37
End: 2022-05-26

## 2022-05-26 DIAGNOSIS — R80.9 PROTEINURIA: Primary | ICD-10-CM

## 2022-05-26 DIAGNOSIS — R10.9 STOMACH ACHE: ICD-10-CM

## 2022-06-02 ENCOUNTER — HOSPITAL ENCOUNTER (OUTPATIENT)
Dept: ULTRASOUND IMAGING | Age: 37
Discharge: HOME | End: 2022-06-02
Attending: INTERNAL MEDICINE
Payer: COMMERCIAL

## 2022-06-02 DIAGNOSIS — R80.9 PROTEINURIA: ICD-10-CM

## 2022-06-02 DIAGNOSIS — R10.9 STOMACH ACHE: ICD-10-CM

## 2022-06-02 PROCEDURE — 76700 US EXAM ABDOM COMPLETE: CPT

## 2023-05-21 RX ORDER — IBUPROFEN 800 MG/1
800 TABLET ORAL EVERY 8 HOURS PRN
COMMUNITY
Start: 2021-05-15

## 2023-05-21 RX ORDER — LEVOTHYROXINE SODIUM 0.07 MG/1
50 TABLET ORAL
COMMUNITY

## 2023-05-21 RX ORDER — SENNA PLUS 8.6 MG/1
8.6 TABLET ORAL DAILY
COMMUNITY
Start: 2021-05-15

## 2023-09-26 ENCOUNTER — TRANSCRIBE ORDERS (OUTPATIENT)
Facility: HOSPITAL | Age: 38
End: 2023-09-26

## 2023-09-26 DIAGNOSIS — M51.26 HERNIATED NUCLEUS PULPOSUS, LUMBAR: Primary | ICD-10-CM

## 2023-10-05 ENCOUNTER — HOSPITAL ENCOUNTER (OUTPATIENT)
Facility: HOSPITAL | Age: 38
Discharge: HOME | End: 2023-10-05
Attending: FAMILY MEDICINE
Payer: COMMERCIAL

## 2023-10-05 DIAGNOSIS — R10.10 LIVER PAIN: ICD-10-CM

## 2023-10-05 DIAGNOSIS — R16.1 SPLEEN ENLARGEMENT: ICD-10-CM

## 2023-10-05 PROCEDURE — 76700 US EXAM ABDOM COMPLETE: CPT

## 2024-04-22 ENCOUNTER — HOSPITAL ENCOUNTER (OUTPATIENT)
Facility: HOSPITAL | Age: 39
Discharge: HOME | End: 2024-04-25
Attending: FAMILY MEDICINE
Payer: COMMERCIAL

## 2024-04-22 DIAGNOSIS — R10.11 RIGHT UPPER QUADRANT PAIN: ICD-10-CM

## 2024-04-22 PROCEDURE — 6360000004 HC RX CONTRAST MEDICATION: Performed by: FAMILY MEDICINE

## 2024-04-22 PROCEDURE — 74160 CT ABDOMEN W/CONTRAST: CPT

## 2024-04-22 RX ADMIN — IOPAMIDOL 100 ML: 755 INJECTION, SOLUTION INTRAVENOUS at 09:10
